# Patient Record
Sex: FEMALE | Race: WHITE | NOT HISPANIC OR LATINO | Employment: OTHER | URBAN - METROPOLITAN AREA
[De-identification: names, ages, dates, MRNs, and addresses within clinical notes are randomized per-mention and may not be internally consistent; named-entity substitution may affect disease eponyms.]

---

## 2017-04-04 ENCOUNTER — APPOINTMENT (OUTPATIENT)
Dept: WOMENS IMAGING | Facility: HOSPITAL | Age: 62
End: 2017-04-04

## 2017-04-04 PROCEDURE — 77067 SCR MAMMO BI INCL CAD: CPT | Performed by: RADIOLOGY

## 2017-10-17 ENCOUNTER — OFFICE VISIT (OUTPATIENT)
Dept: FAMILY MEDICINE CLINIC | Facility: CLINIC | Age: 62
End: 2017-10-17

## 2017-10-17 VITALS
BODY MASS INDEX: 39.4 KG/M2 | DIASTOLIC BLOOD PRESSURE: 90 MMHG | SYSTOLIC BLOOD PRESSURE: 136 MMHG | WEIGHT: 236.5 LBS | HEIGHT: 65 IN | TEMPERATURE: 97.8 F | HEART RATE: 72 BPM | RESPIRATION RATE: 16 BRPM | OXYGEN SATURATION: 98 %

## 2017-10-17 DIAGNOSIS — F43.21 GRIEF REACTION: ICD-10-CM

## 2017-10-17 DIAGNOSIS — Z23 NEED FOR INFLUENZA VACCINATION: ICD-10-CM

## 2017-10-17 DIAGNOSIS — E03.9 HYPOTHYROIDISM, UNSPECIFIED TYPE: Primary | ICD-10-CM

## 2017-10-17 DIAGNOSIS — H61.21 HEARING LOSS DUE TO CERUMEN IMPACTION, RIGHT: ICD-10-CM

## 2017-10-17 PROCEDURE — 99204 OFFICE O/P NEW MOD 45 MIN: CPT | Performed by: FAMILY MEDICINE

## 2017-10-17 PROCEDURE — 90686 IIV4 VACC NO PRSV 0.5 ML IM: CPT | Performed by: FAMILY MEDICINE

## 2017-10-17 PROCEDURE — 90471 IMMUNIZATION ADMIN: CPT | Performed by: FAMILY MEDICINE

## 2017-10-17 RX ORDER — KETOCONAZOLE 20 MG/ML
120 SHAMPOO TOPICAL 2 TIMES WEEKLY
Refills: 0 | COMMUNITY
Start: 2017-08-16 | End: 2019-07-02

## 2017-10-17 RX ORDER — GLIMEPIRIDE 2 MG/1
1 TABLET ORAL 2 TIMES DAILY
COMMUNITY
End: 2019-11-01 | Stop reason: SDUPTHER

## 2017-10-17 RX ORDER — LEFLUNOMIDE 20 MG/1
20 TABLET ORAL DAILY
COMMUNITY

## 2017-10-17 RX ORDER — RAMIPRIL 10 MG/1
10 CAPSULE ORAL DAILY
Refills: 2 | COMMUNITY
Start: 2017-09-03 | End: 2017-12-12 | Stop reason: SDUPTHER

## 2017-10-17 RX ORDER — FLUOCINOLONE ACETONIDE 0.11 MG/ML
0.01 OIL TOPICAL
Refills: 0 | COMMUNITY
Start: 2017-08-16 | End: 2019-07-02

## 2017-10-17 RX ORDER — LEVOTHYROXINE SODIUM 112 MCG
112 TABLET ORAL DAILY
Refills: 2 | COMMUNITY
Start: 2017-09-03 | End: 2017-10-30

## 2017-10-17 NOTE — PATIENT INSTRUCTIONS
Please fill out a record release form.      Try to find out if your sister was ever tested for Trujillo Syndrome and/or BRCA 1/2.    Consider calling [facility] to enquire about grief counseling.      Ask Dr. Watkins about whether you can get a pneumonia vaccine.      Try to check your thyroid for lumps each month.      Complicated Grieving  Grief is a normal response to the death of someone close to you. Feelings of fear, anger, and guilt can affect almost everyone who loses a loved one. It is also common to have symptoms of depression while you are grieving. These include problems with sleep, loss of appetite, and lack of energy. They may last for weeks or months after a loss.  Complicated grief is different from normal grief or depression. Normal grieving involves sadness and feelings of loss, but these feelings are not constant. Complicated grief is a constant and severe type of grief. It interferes with your ability to function normally. It may last for several months to a year or longer. Complicated grief may require treatment from a mental health care provider.  CAUSES   It is not known why some people continue to struggle with grief and others do not. You may be at higher risk for complicated grief if:  · The death of your loved one was sudden or unexpected.  · The death of your loved one was due to a violent event.  · Your loved one committed suicide.  · Your loved one was a child or a young person.  · You were very close to or dependent on the loved one.  · You have a history of depression.  SIGNS AND SYMPTOMS  Signs and symptoms of complicated grief may include:  · Feeling disbelief or numbness.  · Being unable to enjoy good memories of your loved one.  · Needing to avoid anything that reminds you of your loved one.  · Being unable to stop thinking about the death.  · Feeling intense anger or guilt.  · Feeling alone and hopeless.  · Feeling that your life is meaningless and empty.  · Losing the desire to  live.  DIAGNOSIS  Your health care provider may diagnose complicated grief if:  · You have constant symptoms of grief for 6-12 months or longer.  · Your symptoms are interfering with your ability to live your life.  Your health care provider may want you to see a mental health care provider. Many symptoms of depression are similar to the symptoms of complicated grief. It is important to be evaluated for complicated grief along with other mental health conditions.  TREATMENT   Talk therapy with a mental health provider is the most common treatment for complicated grief. During therapy, you will learn healthy ways to cope with the loss of your loved one. In some cases, your mental health care provider may also recommend antidepressant medicines.  HOME CARE INSTRUCTIONS  · Take care of yourself.  ¨ Eat regular meals and maintain a healthy diet. Eat plenty of fruits, vegetables, and whole grains.  ¨ Try to get some exercise each day.  ¨ Keep regular hours for sleep. Try to get at least 8 hours of sleep each night.  · Do not use drugs or alcohol to ease your symptoms.  · Take medicines only as directed by your health care provider.  · Spend time with friends and loved ones.  · Consider joining a grief (bereavement) support group to help you deal with your loss.  · Keep all follow-up visits as directed by your health care provider. This is important.  SEEK MEDICAL CARE IF:  · Your symptoms keep you from functioning normally.  · Your symptoms do not get better with treatment.  SEEK IMMEDIATE MEDICAL CARE IF:  · You have serious thoughts of hurting yourself or someone else.  · You have suicidal feelings.     This information is not intended to replace advice given to you by your health care provider. Make sure you discuss any questions you have with your health care provider.     Document Released: 12/18/2006 Document Revised: 04/10/2017 Document Reviewed: 05/28/2015  [employer] Interactive Patient Education ©2017 [employer]  Inc[employer]

## 2017-10-17 NOTE — PROGRESS NOTES
Subjective   Nehal Diaz is a 61 y.o. female. Being seen in office today to establish care.    Chief Complaint   Patient presents with   • Establish Care     change PCP    • Hypothyroidism     medication refill       HPI     Hypothyroidism:  -chronic problem  -dose of Synthroid increased to 112 mcg last April (6 months ago)  -occasional palpitations make her think her dose may be too high, so she would like to get her TSH checked today  -she also needs a Synthroid refill    Acute grief:  -pt recently lost her identical twin sister to uterine cancer  -pt's step daughter also recently  from ovarian cancer at age 24  - pt's favorite aunt also just  of lung cancer  -her step-daughter's  is raising their young child (who is like a grandchild to pt), so that is good  -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 Marko Watkins at [address] [facility], [address], [phone]  -always gets labs done each month  -next Rheumatology appt is scheduled for next week    HTN:  -BP usually well controlled on current regimen   -it has been difficult to exercise or eat well with recent deaths in the family, but she is planning to get back in the gym soon    Pap smear and mammogram normal in 2017 with her Gynecologist Dr. Nora Riojas  Colonoscopy WNL in 2017, plan for repeat in 5 yr         Review of Systems   Constitutional: Positive for fever and unexpected weight change (gaining weight). Negative for appetite change and fatigue.   HENT: Positive for hearing loss (on right). Negative for congestion, ear discharge, ear pain, rhinorrhea and sinus pressure.    Eyes: Negative for pain and visual disturbance.   Respiratory: Negative for cough, chest tightness and shortness of  breath.    Cardiovascular: Positive for palpitations. Negative for chest pain.   Gastrointestinal: Negative for abdominal pain, blood in stool, constipation, diarrhea, nausea and vomiting.   Genitourinary: Negative for dysuria, hematuria and vaginal bleeding.   Musculoskeletal: Negative for arthralgias, gait problem and myalgias.   Skin: Negative for rash and wound.   Allergic/Immunologic: Positive for environmental allergies.   Neurological: Negative for dizziness, syncope, weakness, light-headedness and numbness.   Hematological: Negative for adenopathy. Does not bruise/bleed easily.   Psychiatric/Behavioral: Positive for decreased concentration, dysphoric mood and sleep disturbance. Negative for suicidal ideas. The patient is not nervous/anxious.    All other systems reviewed and are negative.      The following portions of the patient's history were reviewed and updated as appropriate: allergies, current medications, past family history, past medical history, past social history, past surgical history and problem list.    Past Medical History:   Diagnosis Date   • Arthritis    • Cancer     skin   • Cataract    • Cholelithiasis    • GERD (gastroesophageal reflux disease)    • Hypertension    • Hypothyroidism    • Kidney stone    • Obesity    • Osteoporosis    • Rheumatoid arthritis    • Seborrheic dermatitis of scalp    • Visual impairment      Past Surgical History:   Procedure Laterality Date   • APPENDECTOMY     • CHOLECYSTECTOMY     • COLONOSCOPY     • EYE SURGERY     • HYSTERECTOMY     • SKIN CANCER EXCISION       Family History   Problem Relation Age of Onset   • Cancer Mother      breast and renal cell   • Kidney disease Mother    • Hyperlipidemia Mother    • Hypertension Mother    • Alcohol abuse Father    • Arthritis Father    • Cancer Father      lung   • Heart disease Father    • Hypertension Father    • Cancer Sister      ovarian   • Diabetes Sister    • Hypertension Sister    • Thyroid disease Sister     • Cancer Brother    • Arthritis Maternal Aunt    • Cancer Maternal Aunt      breast   • Mental illness Maternal Aunt    • Hyperlipidemia Maternal Aunt    • Hypertension Maternal Aunt    • Alcohol abuse Maternal Uncle    • Cancer Maternal Uncle      prostate   • Heart disease Maternal Uncle    • Arthritis Paternal Aunt    • Cancer Paternal Aunt      ovarian   • Diabetes Maternal Grandfather    • Miscarriages / Stillbirths Paternal Grandmother    • Cancer Paternal Grandfather      prostate   • Cancer Sister      uterine   • Cancer Sister      colon   • Cancer Maternal Aunt      colon     Social History     Social History   • Marital status: Unknown       Social History Main Topics   • Smoking status: Never Smoker   • Smokeless tobacco: Never Used   • Alcohol use Yes      Comment: socially, rarely   • Drug use: No   • Sexual activity: No     Social History Narrative    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  Lives at home with her .          Allergies   Allergen Reactions   • Codeine Nausea And Vomiting          Current Outpatient Prescriptions:   •  abatacept (ORENCIA) 250 MG injection, Infuse  into a venous catheter., Disp: , Rfl:   •  FLUOCINOLONE ACETONIDE SCALP 0.01 % oil, 0.01 application., Disp: , Rfl: 0  •  ketoconazole (NIZORAL) 2 % shampoo, Apply 120 application topically 2 (Two) Times a Week., Disp: , Rfl: 0  •  leflunomide (ARAVA) 20 MG tablet, Take 20 mg by mouth Daily., Disp: , Rfl:   •  ramipril (ALTACE) 10 MG capsule, Take 10 mg by mouth Daily., Disp: , Rfl: 2  •  SYNTHROID 112 MCG tablet, Take 112 mcg by mouth Daily., Disp: , Rfl: 2  •  timolol (TIMOPTIC) 0.25 % ophthalmic solution, 1 drop 2 (Two) Times a Day., Disp: , Rfl:     Objective     Vitals:    10/17/17 1520   BP: 136/90   Pulse: 72   Resp: 16   Temp: 97.8 °F (36.6 °C)   SpO2: 98%       Physical Exam   Constitutional: Vital signs are normal. She appears well-developed and well-nourished. No  distress.   BMI 40   HENT:   Head: Normocephalic and atraumatic.   Left Ear: Tympanic membrane and ear canal normal.   Nose: Nose normal.   Mouth/Throat: Oropharynx is clear and moist.   Cerumen obscuring right TM   Eyes: Conjunctivae are normal. Pupils are equal, round, and reactive to light.   Neck: Thyromegaly present.   Cardiovascular: Normal rate, regular rhythm, S1 normal, S2 normal and normal heart sounds.  Exam reveals no gallop and no friction rub.    No murmur heard.  Pulses:       Radial pulses are 2+ on the right side, and 2+ on the left side.   Pulmonary/Chest: Effort normal and breath sounds normal. She has no wheezes. She has no rales.   Abdominal: Soft. Bowel sounds are normal. She exhibits no distension. There is no hepatosplenomegaly. There is no tenderness. There is no rebound and no guarding.   Musculoskeletal: She exhibits no edema or deformity.   Lymphadenopathy:     She has no cervical adenopathy.        Right: No supraclavicular adenopathy present.        Left: No supraclavicular adenopathy present.   Neurological: She is alert. She has normal strength. Gait normal.   Skin: Skin is warm and dry. No cyanosis. Nails show no clubbing.   Psychiatric: She has a normal mood and affect. Her speech is normal and behavior is normal.   Nursing note and vitals reviewed.      ASSESSMENT/PLAN       Problem List Items Addressed This Visit        Endocrine    Hypothyroidism - Primary    Relevant Medications    SYNTHROID 112 MCG tablet    Other Relevant Orders    TSH (Completed)    Ambulatory Referral to ENT (Otolaryngology)      Other Visit Diagnoses     Grief reaction      Pt advised to seek grief counseling through [facility]      Hearing loss due to cerumen impaction, right        Relevant Orders    Ambulatory Referral to ENT (Otolaryngology)  Pt advised to use a 50/50 mixture of white vinegar and rubbing alcohol to loosen cerumen prior to ENT appointment      Need for influenza vaccination        Relevant  Orders    Flu Vaccine Intradermal Quad 18-64YR (FLUZONE INJ 6320-7833) (Completed)        Labs requested from pt's Rheumatologist's office      Patient Instructions   Please fill out a record release form.      Try to find out if your sister was ever tested for Trujillo Syndrome and/or BRCA 1/2.    Consider calling [facility] to enquire about grief counseling.      Ask Dr. Watkins about whether you can get a pneumonia vaccine.      Try to check your thyroid for lumps each month.      Complicated Grieving  Grief is a normal response to the death of someone close to you. Feelings of fear, anger, and guilt can affect almost everyone who loses a loved one. It is also common to have symptoms of depression while you are grieving. These include problems with sleep, loss of appetite, and lack of energy. They may last for weeks or months after a loss.  Complicated grief is different from normal grief or depression. Normal grieving involves sadness and feelings of loss, but these feelings are not constant. Complicated grief is a constant and severe type of grief. It interferes with your ability to function normally. It may last for several months to a year or longer. Complicated grief may require treatment from a mental health care provider.  CAUSES   It is not known why some people continue to struggle with grief and others do not. You may be at higher risk for complicated grief if:  · The death of your loved one was sudden or unexpected.  · The death of your loved one was due to a violent event.  · Your loved one committed suicide.  · Your loved one was a child or a young person.  · You were very close to or dependent on the loved one.  · You have a history of depression.  SIGNS AND SYMPTOMS  Signs and symptoms of complicated grief may include:  · Feeling disbelief or numbness.  · Being unable to enjoy good memories of your loved one.  · Needing to avoid anything that reminds you of your loved one.  · Being unable to stop thinking  about the death.  · Feeling intense anger or guilt.  · Feeling alone and hopeless.  · Feeling that your life is meaningless and empty.  · Losing the desire to live.  DIAGNOSIS  Your health care provider may diagnose complicated grief if:  · You have constant symptoms of grief for 6-12 months or longer.  · Your symptoms are interfering with your ability to live your life.  Your health care provider may want you to see a mental health care provider. Many symptoms of depression are similar to the symptoms of complicated grief. It is important to be evaluated for complicated grief along with other mental health conditions.  TREATMENT   Talk therapy with a mental health provider is the most common treatment for complicated grief. During therapy, you will learn healthy ways to cope with the loss of your loved one. In some cases, your mental health care provider may also recommend antidepressant medicines.  HOME CARE INSTRUCTIONS  · Take care of yourself.  ¨ Eat regular meals and maintain a healthy diet. Eat plenty of fruits, vegetables, and whole grains.  ¨ Try to get some exercise each day.  ¨ Keep regular hours for sleep. Try to get at least 8 hours of sleep each night.  · Do not use drugs or alcohol to ease your symptoms.  · Take medicines only as directed by your health care provider.  · Spend time with friends and loved ones.  · Consider joining a grief (bereavement) support group to help you deal with your loss.  · Keep all follow-up visits as directed by your health care provider. This is important.  SEEK MEDICAL CARE IF:  · Your symptoms keep you from functioning normally.  · Your symptoms do not get better with treatment.  SEEK IMMEDIATE MEDICAL CARE IF:  · You have serious thoughts of hurting yourself or someone else.  · You have suicidal feelings.     This information is not intended to replace advice given to you by your health care provider. Make sure you discuss any questions you have with your health care  provider.     Document Released: 12/18/2006 Document Revised: 04/10/2017 Document Reviewed: 05/28/2015  [employer] Interactive Patient Education ©2017 Elsevier Inc.      Return in about 2 months (around 12/17/2017).      Jannet Tripp MD  10/29/17

## 2017-10-18 LAB — TSH SERPL DL<=0.005 MIU/L-ACNC: 5.01 UIU/ML (ref 0.45–4.5)

## 2017-10-30 ENCOUNTER — TELEPHONE (OUTPATIENT)
Dept: FAMILY MEDICINE CLINIC | Facility: CLINIC | Age: 62
End: 2017-10-30

## 2017-10-30 DIAGNOSIS — E03.9 HYPOTHYROIDISM, UNSPECIFIED TYPE: Primary | ICD-10-CM

## 2017-10-30 RX ORDER — LEVOTHYROXINE SODIUM 0.12 MG/1
125 TABLET ORAL DAILY
Qty: 30 TABLET | Refills: 3 | Status: SHIPPED | OUTPATIENT
Start: 2017-10-30 | End: 2018-03-20 | Stop reason: SDUPTHER

## 2017-10-30 NOTE — TELEPHONE ENCOUNTER
----- Message from [place] sent at 10/30/2017  1:59 PM EDT -----  Regarding: Lab Results  Contact: [phone]  Pt called regarding her lab that she had done on 10/17/17. She said that she has not heard anything back regarding her tsh lab. She would like for someone to give her a call regarding this lab and some questions about her meds.

## 2017-10-31 ENCOUNTER — TELEPHONE (OUTPATIENT)
Dept: FAMILY MEDICINE CLINIC | Facility: CLINIC | Age: 62
End: 2017-10-31

## 2017-10-31 DIAGNOSIS — E03.9 HYPOTHYROIDISM, UNSPECIFIED TYPE: Primary | ICD-10-CM

## 2017-12-12 ENCOUNTER — OFFICE VISIT (OUTPATIENT)
Dept: FAMILY MEDICINE CLINIC | Facility: CLINIC | Age: 62
End: 2017-12-12

## 2017-12-12 VITALS
WEIGHT: 238 LBS | BODY MASS INDEX: 39.65 KG/M2 | HEIGHT: 65 IN | HEART RATE: 82 BPM | OXYGEN SATURATION: 98 % | TEMPERATURE: 98.2 F | SYSTOLIC BLOOD PRESSURE: 130 MMHG | DIASTOLIC BLOOD PRESSURE: 80 MMHG

## 2017-12-12 DIAGNOSIS — M54.6 ACUTE RIGHT-SIDED THORACIC BACK PAIN: Primary | ICD-10-CM

## 2017-12-12 DIAGNOSIS — I10 ESSENTIAL HYPERTENSION: ICD-10-CM

## 2017-12-12 DIAGNOSIS — E03.9 ACQUIRED HYPOTHYROIDISM: ICD-10-CM

## 2017-12-12 PROCEDURE — 99214 OFFICE O/P EST MOD 30 MIN: CPT | Performed by: FAMILY MEDICINE

## 2017-12-12 RX ORDER — NAPROXEN 500 MG/1
500 TABLET ORAL 2 TIMES DAILY WITH MEALS
COMMUNITY
End: 2022-03-15

## 2017-12-12 RX ORDER — RAMIPRIL 10 MG/1
10 CAPSULE ORAL DAILY
Qty: 30 CAPSULE | Refills: 11 | Status: SHIPPED | OUTPATIENT
Start: 2017-12-12 | End: 2018-12-03 | Stop reason: SDUPTHER

## 2017-12-12 NOTE — PATIENT INSTRUCTIONS
Try using a heating pad on your back.  It is okay to use over the counter Aleve as needed for back pain.

## 2017-12-12 NOTE — PROGRESS NOTES
Subjective   eNhal Diaz is a 62 y.o. female here to discuss back pain.  Patient stated she has taken OTC Naproxen to help with her symptoms.    Chief Complaint   Patient presents with   • Back Pain       HPI     Back pain:  -mid-thoracic   -started 4 days ago  -improved with OTC Aleve 1-2 x per day  -gradually improving but still present  -occasional chronic numbness and tingling in hands but no weakness  -she worries about shingles and/or pneumonia since she is immunocompromised w/her medications for RA    Pt continues to grieve the loss of her twin sister.  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  She has not yet started counseling due to time constraints.      Hypothyroidism:  -last TSH elevated at 5.01 in October (2 months ago)  -tolerating increased dose of Levothyroxine well, but missed 12 days in November while on vacation    HTN:  -BP running 120s/60s at home   -tolerating ramipril 10 mg daily, refill needed  no chest pain, dizziness, headaches, or acute vision changes         Review of Systems   Constitutional: Negative for fatigue and fever.   HENT: Negative for congestion and sore throat.    Respiratory: Negative for cough and shortness of breath.    Cardiovascular: Negative for chest pain and palpitations.   Gastrointestinal: Negative for abdominal pain and nausea.        Occasional heartburn, responds well to Tums   Musculoskeletal: Positive for arthralgias (due to RA, stable), back pain and myalgias (in back).   Neurological: Negative for dizziness and headaches.   All other systems reviewed and are negative.      The following portions of the patient's history were reviewed and updated as appropriate: allergies, current medications, past family history, past medical history, past social history, past surgical history and problem list.    Past Medical History:   Diagnosis Date   • Arthritis    • Cancer     skin   • Cataract    • Cholelithiasis    • GERD (gastroesophageal reflux  disease)    • Hypertension    • Hypothyroidism    • Kidney stone    • Obesity    • Osteoporosis    • Rheumatoid arthritis    • Seborrheic dermatitis of scalp    • Visual impairment      Past Surgical History:   Procedure Laterality Date   • APPENDECTOMY     • CATARACT EXTRACTION     • CHOLECYSTECTOMY     • COLONOSCOPY     • EYE SURGERY     • HYSTERECTOMY     • SKIN CANCER EXCISION       Family History   Problem Relation Age of Onset   • Cancer Mother      breast and renal cell   • Kidney disease Mother    • Hyperlipidemia Mother    • Hypertension Mother    • Alcohol abuse Father    • Arthritis Father    • Cancer Father      lung   • Heart disease Father    • Hypertension Father    • Cancer Sister      ovarian   • Diabetes Sister    • Hypertension Sister    • Thyroid disease Sister    • Cancer Brother    • Arthritis Maternal Aunt    • Cancer Maternal Aunt      breast   • Mental illness Maternal Aunt    • Hyperlipidemia Maternal Aunt    • Hypertension Maternal Aunt    • Alcohol abuse Maternal Uncle    • Cancer Maternal Uncle      prostate   • Heart disease Maternal Uncle    • Arthritis Paternal Aunt    • Cancer Paternal Aunt      ovarian   • Diabetes Maternal Grandfather    • Miscarriages / Stillbirths Paternal Grandmother    • Cancer Paternal Grandfather      prostate   • Cancer Sister      uterine   • Cancer Sister      colon   • Cancer Maternal Aunt      colon     Social History       Social History Main Topics   • Smoking status: Never Smoker   • Smokeless tobacco: Never Used   • Alcohol use Yes      Comment: socially, rarely   • Drug use: No   • Sexual activity: No     Social History Narrative    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  Lives at home with her .          Allergies   Allergen Reactions   • Codeine Nausea And Vomiting        Outpatient Medications Prior to Visit   Medication Sig Dispense Refill   • abatacept (ORENCIA) 250 MG injection Infuse  into a venous  catheter.     • FLUOCINOLONE ACETONIDE SCALP 0.01 % oil 0.01 application.  0   • ketoconazole (NIZORAL) 2 % shampoo Apply 120 application topically 2 (Two) Times a Week.  0   • leflunomide (ARAVA) 20 MG tablet Take 20 mg by mouth Daily.     • levothyroxine (SYNTHROID) 125 MCG tablet Take 1 tablet by mouth Daily. 30 tablet 3   • timolol (TIMOPTIC) 0.25 % ophthalmic solution 1 drop 2 (Two) Times a Day.     • ramipril (ALTACE) 10 MG capsule Take 10 mg by mouth Daily.  2     No facility-administered medications prior to visit.        Objective     Vitals:    12/12/17 0950   BP: 130/80   Pulse: 82   Temp: 98.2 °F (36.8 °C)   SpO2: 98%       Physical Exam   Constitutional: She appears well-developed and well-nourished. No distress.   HENT:   Head: Normocephalic and atraumatic.   Cardiovascular: Normal rate, regular rhythm and normal heart sounds.  Exam reveals no gallop and no friction rub.    No murmur heard.  Pulmonary/Chest: Effort normal and breath sounds normal. No respiratory distress. She has no wheezes. She has no rhonchi. She has no rales.   Abdominal: Soft. Bowel sounds are normal. She exhibits no distension. There is no tenderness.   Musculoskeletal:        Cervical back: Normal.        Thoracic back: She exhibits tenderness (mild TTP of R paraspinal muscle spasms ) and spasm.        Lumbar back: Normal.   Skin: Skin is warm and dry. No rash noted.       ASSESSMENT/PLAN       Problem List Items Addressed This Visit        Cardiovascular and Mediastinum    Hypertension    Relevant Medications    ramipril (ALTACE) 10 MG capsule  Plan for CMP if not checked within the last 6 months at next appt       Endocrine    Hypothyroidism  Continue levothyroxine 125 mcg daily  Importance of this medication reviewed  TSH in 3 months      Other Visit Diagnoses     Acute right-sided thoracic back pain    -  Primary  Pt reassured that pain appears to be musculoskeletal at this time  Continue OTC NSAIDs   Recommend heating pad  prn and stretching  Pt declines Rx for muscle relaxer        Lab records requestsed from Dr. Watkins's office      Patient Instructions   Try using a heating pad on your back.  It is okay to use over the counter Aleve as needed for back pain.      Return in about 3 months (around 3/12/2018).      Janent Tripp MD  12/12/17

## 2018-01-17 ENCOUNTER — RESULTS ENCOUNTER (OUTPATIENT)
Dept: FAMILY MEDICINE CLINIC | Facility: CLINIC | Age: 63
End: 2018-01-17

## 2018-01-17 DIAGNOSIS — E03.9 HYPOTHYROIDISM, UNSPECIFIED TYPE: ICD-10-CM

## 2018-03-16 LAB — TSH SERPL DL<=0.005 MIU/L-ACNC: 0.68 MIU/ML (ref 0.27–4.2)

## 2018-03-20 ENCOUNTER — OFFICE VISIT (OUTPATIENT)
Dept: FAMILY MEDICINE CLINIC | Facility: CLINIC | Age: 63
End: 2018-03-20

## 2018-03-20 VITALS
DIASTOLIC BLOOD PRESSURE: 86 MMHG | HEART RATE: 89 BPM | HEIGHT: 64 IN | OXYGEN SATURATION: 98 % | BODY MASS INDEX: 40.8 KG/M2 | WEIGHT: 239 LBS | SYSTOLIC BLOOD PRESSURE: 126 MMHG | RESPIRATION RATE: 15 BRPM

## 2018-03-20 DIAGNOSIS — E03.9 HYPOTHYROIDISM, UNSPECIFIED TYPE: Primary | ICD-10-CM

## 2018-03-20 DIAGNOSIS — Z13.220 SCREENING FOR HYPERLIPIDEMIA: ICD-10-CM

## 2018-03-20 DIAGNOSIS — Z13.1 SCREENING FOR DIABETES MELLITUS: ICD-10-CM

## 2018-03-20 DIAGNOSIS — I10 ESSENTIAL HYPERTENSION: ICD-10-CM

## 2018-03-20 PROCEDURE — 99213 OFFICE O/P EST LOW 20 MIN: CPT | Performed by: FAMILY MEDICINE

## 2018-03-20 RX ORDER — LEVOTHYROXINE SODIUM 0.12 MG/1
125 TABLET ORAL DAILY
Qty: 30 TABLET | Refills: 5 | Status: SHIPPED | OUTPATIENT
Start: 2018-03-20 | End: 2018-08-24 | Stop reason: SDUPTHER

## 2018-04-05 ENCOUNTER — APPOINTMENT (OUTPATIENT)
Dept: WOMENS IMAGING | Facility: HOSPITAL | Age: 63
End: 2018-04-05

## 2018-04-05 PROCEDURE — 77067 SCR MAMMO BI INCL CAD: CPT | Performed by: RADIOLOGY

## 2018-04-05 PROCEDURE — 77063 BREAST TOMOSYNTHESIS BI: CPT | Performed by: RADIOLOGY

## 2018-04-23 RX ORDER — TIMOLOL MALEATE 5 MG/ML
SOLUTION/ DROPS OPHTHALMIC
Refills: 11 | COMMUNITY
Start: 2018-04-17

## 2018-05-07 ENCOUNTER — CLINICAL SUPPORT (OUTPATIENT)
Dept: FAMILY MEDICINE CLINIC | Facility: CLINIC | Age: 63
End: 2018-05-07

## 2018-05-07 DIAGNOSIS — Z23 NEED FOR VACCINATION: Primary | ICD-10-CM

## 2018-05-07 PROCEDURE — 90471 IMMUNIZATION ADMIN: CPT | Performed by: FAMILY MEDICINE

## 2018-05-07 PROCEDURE — 90632 HEPA VACCINE ADULT IM: CPT | Performed by: FAMILY MEDICINE

## 2018-05-31 ENCOUNTER — RESULTS ENCOUNTER (OUTPATIENT)
Dept: FAMILY MEDICINE CLINIC | Facility: CLINIC | Age: 63
End: 2018-05-31

## 2018-05-31 DIAGNOSIS — Z13.1 SCREENING FOR DIABETES MELLITUS: ICD-10-CM

## 2018-05-31 DIAGNOSIS — Z13.220 SCREENING FOR HYPERLIPIDEMIA: ICD-10-CM

## 2018-05-31 DIAGNOSIS — I10 ESSENTIAL HYPERTENSION: ICD-10-CM

## 2018-07-23 DIAGNOSIS — E03.9 HYPOTHYROIDISM, UNSPECIFIED TYPE: ICD-10-CM

## 2018-07-23 RX ORDER — LEVOTHYROXINE SODIUM 0.12 MG/1
125 TABLET ORAL DAILY
Qty: 30 TABLET | Refills: 0 | Status: SHIPPED | OUTPATIENT
Start: 2018-07-23 | End: 2018-08-16 | Stop reason: SDUPTHER

## 2018-08-16 ENCOUNTER — OFFICE VISIT (OUTPATIENT)
Dept: FAMILY MEDICINE CLINIC | Facility: CLINIC | Age: 63
End: 2018-08-16

## 2018-08-16 VITALS
RESPIRATION RATE: 13 BRPM | HEIGHT: 64 IN | SYSTOLIC BLOOD PRESSURE: 114 MMHG | WEIGHT: 245 LBS | BODY MASS INDEX: 41.83 KG/M2 | OXYGEN SATURATION: 99 % | DIASTOLIC BLOOD PRESSURE: 84 MMHG | HEART RATE: 78 BPM

## 2018-08-16 DIAGNOSIS — H61.22 IMPACTED CERUMEN OF LEFT EAR: ICD-10-CM

## 2018-08-16 DIAGNOSIS — M06.9 RHEUMATOID ARTHRITIS, INVOLVING UNSPECIFIED SITE, UNSPECIFIED RHEUMATOID FACTOR PRESENCE: ICD-10-CM

## 2018-08-16 DIAGNOSIS — Z00.00 ANNUAL PHYSICAL EXAM: Primary | ICD-10-CM

## 2018-08-16 DIAGNOSIS — E03.9 HYPOTHYROIDISM, UNSPECIFIED TYPE: ICD-10-CM

## 2018-08-16 DIAGNOSIS — Z13.1 SCREENING FOR DIABETES MELLITUS: ICD-10-CM

## 2018-08-16 DIAGNOSIS — E55.9 VITAMIN D DEFICIENCY: ICD-10-CM

## 2018-08-16 DIAGNOSIS — E78.5 HYPERLIPIDEMIA, UNSPECIFIED HYPERLIPIDEMIA TYPE: ICD-10-CM

## 2018-08-16 DIAGNOSIS — F32.A DEPRESSION, UNSPECIFIED DEPRESSION TYPE: ICD-10-CM

## 2018-08-16 LAB
25(OH)D3+25(OH)D2 SERPL-MCNC: 18.4 NG/ML (ref 30–100)
ALBUMIN SERPL-MCNC: 4.2 G/DL (ref 3.5–5.2)
ALBUMIN/GLOB SERPL: 1.2 G/DL
ALP SERPL-CCNC: 92 U/L (ref 39–117)
ALT SERPL-CCNC: 19 U/L (ref 1–33)
AST SERPL-CCNC: 17 U/L (ref 1–32)
BILIRUB SERPL-MCNC: 0.5 MG/DL (ref 0.1–1.2)
BUN SERPL-MCNC: 15 MG/DL (ref 8–23)
BUN/CREAT SERPL: 19.5 (ref 7–25)
CALCIUM SERPL-MCNC: 10.1 MG/DL (ref 8.6–10.5)
CHLORIDE SERPL-SCNC: 100 MMOL/L (ref 98–107)
CHOLEST SERPL-MCNC: 212 MG/DL (ref 0–200)
CO2 SERPL-SCNC: 28.1 MMOL/L (ref 22–29)
CREAT SERPL-MCNC: 0.77 MG/DL (ref 0.57–1)
ERYTHROCYTE [DISTWIDTH] IN BLOOD BY AUTOMATED COUNT: 14.6 % (ref 11.7–13)
GLOBULIN SER CALC-MCNC: 3.5 GM/DL
GLUCOSE SERPL-MCNC: 146 MG/DL (ref 65–99)
HBA1C MFR BLD: 6.73 % (ref 4.8–5.6)
HCT VFR BLD AUTO: 45.6 % (ref 35.6–45.5)
HDLC SERPL-MCNC: 40 MG/DL (ref 40–60)
HGB BLD-MCNC: 14.7 G/DL (ref 11.9–15.5)
LDLC SERPL CALC-MCNC: 119 MG/DL (ref 0–100)
MCH RBC QN AUTO: 29.7 PG (ref 26.9–32)
MCHC RBC AUTO-ENTMCNC: 32.2 G/DL (ref 32.4–36.3)
MCV RBC AUTO: 92.1 FL (ref 80.5–98.2)
PLATELET # BLD AUTO: 198 10*3/MM3 (ref 140–500)
POTASSIUM SERPL-SCNC: 4.5 MMOL/L (ref 3.5–5.2)
PROT SERPL-MCNC: 7.7 G/DL (ref 6–8.5)
RBC # BLD AUTO: 4.95 10*6/MM3 (ref 3.9–5.2)
SODIUM SERPL-SCNC: 140 MMOL/L (ref 136–145)
TRIGL SERPL-MCNC: 265 MG/DL (ref 0–150)
TSH SERPL DL<=0.005 MIU/L-ACNC: 4.74 MIU/ML (ref 0.27–4.2)
VLDLC SERPL CALC-MCNC: 53 MG/DL (ref 5–40)
WBC # BLD AUTO: 8.91 10*3/MM3 (ref 4.5–10.7)

## 2018-08-16 PROCEDURE — 69210 REMOVE IMPACTED EAR WAX UNI: CPT | Performed by: FAMILY MEDICINE

## 2018-08-16 PROCEDURE — 99396 PREV VISIT EST AGE 40-64: CPT | Performed by: FAMILY MEDICINE

## 2018-08-16 NOTE — PATIENT INSTRUCTIONS
"Consider getting involved with some volunteer work.    Consider enrolling in [employer] when you turn 63.      Try to exercise at least 3 times per week for 30 minutes per session.    Don't forget to get a flu shot in the fall and a second dose of Hep A vaccine in November!      DASH Eating Plan  DASH stands for \"Dietary Approaches to Stop Hypertension.\" The DASH eating plan is a healthy eating plan that has been shown to reduce high blood pressure (hypertension). It may also reduce your risk for type 2 diabetes, heart disease, and stroke. The DASH eating plan may also help with weight loss.  What are tips for following this plan?  General guidelines  · Avoid eating more than 2,300 mg (milligrams) of salt (sodium) a day. If you have hypertension, you may need to reduce your sodium intake to 1,500 mg a day.  · Limit alcohol intake to no more than 1 drink a day for nonpregnant women and 2 drinks a day for men. One drink equals 12 oz of beer, 5 oz of wine, or 1½ oz of hard liquor.  · Work with your health care provider to maintain a healthy body weight or to lose weight. Ask what an ideal weight is for you.  · Get at least 30 minutes of exercise that causes your heart to beat faster (aerobic exercise) most days of the week. Activities may include walking, swimming, or biking.  · Work with your health care provider or diet and nutrition specialist (dietitian) to adjust your eating plan to your individual calorie needs.  Reading food labels  · Check food labels for the amount of sodium per serving. Choose foods with less than 5 percent of the Daily Value of sodium. Generally, foods with less than 300 mg of sodium per serving fit into this eating plan.  · To find whole grains, look for the word \"whole\" as the first word in the ingredient list.  Shopping  · Buy products labeled as \"low-sodium\" or \"no salt added.\"  · Buy fresh foods. Avoid canned foods and premade or frozen meals.  Cooking  · Avoid adding salt when " cooking. Use salt-free seasonings or herbs instead of table salt or sea salt. Check with your health care provider or pharmacist before using salt substitutes.  · Do not rahman foods. Cook foods using healthy methods such as baking, boiling, grilling, and broiling instead.  · Cook with heart-healthy oils, such as olive, canola, soybean, or sunflower oil.  Meal planning    · Eat a balanced diet that includes:  ? 5 or more servings of fruits and vegetables each day. At each meal, try to fill half of your plate with fruits and vegetables.  ? Up to 6-8 servings of whole grains each day.  ? Less than 6 oz of lean meat, poultry, or fish each day. A 3-oz serving of meat is about the same size as a deck of cards. One egg equals 1 oz.  ? 2 servings of low-fat dairy each day.  ? A serving of nuts, seeds, or beans 5 times each week.  ? Heart-healthy fats. Healthy fats called Omega-3 fatty acids are found in foods such as flaxseeds and coldwater fish, like sardines, salmon, and mackerel.  · Limit how much you eat of the following:  ? Canned or prepackaged foods.  ? Food that is high in trans fat, such as fried foods.  ? Food that is high in saturated fat, such as fatty meat.  ? Sweets, desserts, sugary drinks, and other foods with added sugar.  ? Full-fat dairy products.  · Do not salt foods before eating.  · Try to eat at least 2 vegetarian meals each week.  · Eat more home-cooked food and less restaurant, buffet, and fast food.  · When eating at a restaurant, ask that your food be prepared with less salt or no salt, if possible.  What foods are recommended?  The items listed may not be a complete list. Talk with your dietitian about what dietary choices are best for you.  Grains  Whole-grain or whole-wheat bread. Whole-grain or whole-wheat pasta. Brown rice. Oatmeal. Quinoa. Bulgur. Whole-grain and low-sodium cereals. Adele bread. Low-fat, low-sodium crackers. Whole-wheat flour tortillas.  Vegetables  Fresh or frozen vegetables  (raw, steamed, roasted, or grilled). Low-sodium or reduced-sodium tomato and vegetable juice. Low-sodium or reduced-sodium tomato sauce and tomato paste. Low-sodium or reduced-sodium canned vegetables.  Fruits  All fresh, dried, or frozen fruit. Canned fruit in natural juice (without added sugar).  Meat and other protein foods  Skinless chicken or turkey. Ground chicken or turkey. Pork with fat trimmed off. Fish and seafood. Egg whites. Dried beans, peas, or lentils. Unsalted nuts, nut butters, and seeds. Unsalted canned beans. Lean cuts of beef with fat trimmed off. Low-sodium, lean deli meat.  Dairy  Low-fat (1%) or fat-free (skim) milk. Fat-free, low-fat, or reduced-fat cheeses. Nonfat, low-sodium ricotta or cottage cheese. Low-fat or nonfat yogurt. Low-fat, low-sodium cheese.  Fats and oils  Soft margarine without trans fats. Vegetable oil. Low-fat, reduced-fat, or light mayonnaise and salad dressings (reduced-sodium). Canola, safflower, olive, soybean, and sunflower oils. Avocado.  Seasoning and other foods  Herbs. Spices. Seasoning mixes without salt. Unsalted popcorn and pretzels. Fat-free sweets.  What foods are not recommended?  The items listed may not be a complete list. Talk with your dietitian about what dietary choices are best for you.  Grains  Baked goods made with fat, such as croissants, muffins, or some breads. Dry pasta or rice meal packs.  Vegetables  Creamed or fried vegetables. Vegetables in a cheese sauce. Regular canned vegetables (not low-sodium or reduced-sodium). Regular canned tomato sauce and paste (not low-sodium or reduced-sodium). Regular tomato and vegetable juice (not low-sodium or reduced-sodium). Pickles. Olives.  Fruits  Canned fruit in a light or heavy syrup. Fried fruit. Fruit in cream or butter sauce.  Meat and other protein foods  Fatty cuts of meat. Ribs. Fried meat. Scales. Sausage. Bologna and other processed lunch meats. Salami. Fatback. Hotdogs. Bratwurst. Salted nuts  and seeds. Canned beans with added salt. Canned or smoked fish. Whole eggs or egg yolks. Chicken or turkey with skin.  Dairy  Whole or 2% milk, cream, and half-and-half. Whole or full-fat cream cheese. Whole-fat or sweetened yogurt. Full-fat cheese. Nondairy creamers. Whipped toppings. Processed cheese and cheese spreads.  Fats and oils  Butter. Stick margarine. Lard. Shortening. Ghee. Scales fat. Tropical oils, such as coconut, palm kernel, or palm oil.  Seasoning and other foods  Salted popcorn and pretzels. Onion salt, garlic salt, seasoned salt, table salt, and sea salt. Worcestershire sauce. Tartar sauce. Barbecue sauce. Teriyaki sauce. Soy sauce, including reduced-sodium. Steak sauce. Canned and packaged gravies. Fish sauce. Oyster sauce. Cocktail sauce. Horseradish that you find on the shelf. Ketchup. Mustard. Meat flavorings and tenderizers. Bouillon cubes. Hot sauce and Tabasco sauce. Premade or packaged marinades. Premade or packaged taco seasonings. Relishes. Regular salad dressings.  Where to find more information:  · National [employer], [employer] [place]: www.nhlbi.nih.gov  · American Heart Association: www.heart.org  Summary  · The DASH eating plan is a healthy eating plan that has been shown to reduce high blood pressure (hypertension). It may also reduce your risk for type 2 diabetes, heart disease, and stroke.  · With the DASH eating plan, you should limit salt (sodium) intake to 2,300 mg a day. If you have hypertension, you may need to reduce your sodium intake to 1,500 mg a day.  · When on the DASH eating plan, aim to eat more fresh fruits and vegetables, whole grains, lean proteins, low-fat dairy, and heart-healthy fats.  · Work with your health care provider or diet and nutrition specialist (dietitian) to adjust your eating plan to your individual calorie needs.  This information is not intended to replace advice given to you by your health care provider. Make sure you discuss any questions  you have with your health care provider.  Document Released: 12/06/2012 Document Revised: 12/11/2017 Document Reviewed: 12/11/2017  [employer] Interactive Patient Education © 2018 [employer]

## 2018-08-16 NOTE — PROGRESS NOTES
Subjective   Nehal Diaz is a 62 y.o. female. Patient is here today for   Chief Complaint   Patient presents with   • Annual Exam     Physical            Nehal Diaz presents for an Annual Wellness Visit.  she has a history of   Patient Active Problem List   Diagnosis   • History of skin cancer   • Rheumatoid arthritis (CMS/HCC)   • Seborrheic dermatitis of scalp   • GERD (gastroesophageal reflux disease)   • Arthritis   • Hypertension   • Kidney stone   • Hypothyroidism   • Obesity   • Osteoporosis   • Visual impairment   • Chronic seasonal allergic rhinitis due to pollen   • Hyperlipidemia   .      HPI      Health Habits:  Dental Exam. up to date  Eye Exam. up to date  Exercise: 0 times/week.  Current exercise activities include: none   BP well controlled on current regimen  Pap smear, DEXA, and mammogram UTD in March with her OB/GYN, Dr. Riojas  Annual dermatology appt scheduled next week  She has consumed some orange juice this morning but no food  Vaccinations are UTD through Nov 2018  Colonoscopy UTD through Jan 2020    Pt is concerned about depression: hypersomnia, anhedonia, NO SI  -under a lot of stress with home rennovations  -retired about 1 yr ago  -grieving the loss of her twin sister last year    RA pain flaring up with the rain  Diclofenac gel prescribed by her Rheumatologist helps     The following portions of the patient's history were reviewed and updated as appropriate: allergies, current medications, past family history, past medical history, past social history, past surgical history and problem list.    Past Medical History:   Diagnosis Date   • Allergic     seasonal   • Arthritis    • Cancer (CMS/HCC)     skin, followed by Dermatology   • Cataract    • Cerumen impaction    • Cholelithiasis    • GERD (gastroesophageal reflux disease)    • Hyperlipidemia    • Hypertension    • Hypothyroidism    • Kidney stone    • Obesity    • Osteoporosis    • Rheumatoid arthritis (CMS/HCC)     followed by  Rheumatology, Dr. Watkins   • Seborrheic dermatitis of scalp    • Visual impairment      Past Surgical History:   Procedure Laterality Date   • APPENDECTOMY     • CATARACT EXTRACTION     • CHOLECYSTECTOMY     • COLONOSCOPY     • EYE SURGERY     • HYSTERECTOMY     • SKIN CANCER EXCISION       Family History   Problem Relation Age of Onset   • Cancer Mother         breast and renal cell   • Kidney disease Mother    • Hyperlipidemia Mother    • Hypertension Mother    • Alcohol abuse Father    • Arthritis Father    • Cancer Father         lung   • Heart disease Father    • Hypertension Father    • Cancer Sister         ovarian   • Diabetes Sister    • Hypertension Sister    • Thyroid disease Sister    • Cancer Brother    • Arthritis Maternal Aunt    • Cancer Maternal Aunt         breast   • Mental illness Maternal Aunt    • Hyperlipidemia Maternal Aunt    • Hypertension Maternal Aunt    • Alcohol abuse Maternal Uncle    • Cancer Maternal Uncle         prostate   • Heart disease Maternal Uncle    • Arthritis Paternal Aunt    • Cancer Paternal Aunt         ovarian   • Diabetes Maternal Grandfather    • Miscarriages / Stillbirths Paternal Grandmother    • Cancer Paternal Grandfather         prostate   • Cancer Sister         uterine   • Cancer Sister         colon   • Cancer Maternal Aunt         colon        Allergies   Allergen Reactions   • Codeine Nausea And Vomiting      Social History       Social History Main Topics   • Smoking status: Never Smoker   • Smokeless tobacco: Never Used   • Alcohol use Yes      Comment: socially, rarely   • Drug use: No   • Sexual activity: No     Social History Narrative    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  Lives at home with her .        Outpatient Medications Prior to Visit   Medication Sig Dispense Refill   • abatacept (ORENCIA) 250 MG injection Infuse  into a venous catheter.     • diclofenac (VOLTAREN) 1 % gel gel VICKEY 4 GRAMS TO  AFFECTED JOINTS QID PRN  3   • FLUOCINOLONE ACETONIDE SCALP 0.01 % oil 0.01 application.  0   • ketoconazole (NIZORAL) 2 % shampoo Apply 120 application topically 2 (Two) Times a Week.  0   • leflunomide (ARAVA) 20 MG tablet Take 20 mg by mouth Daily.     • levothyroxine (SYNTHROID) 125 MCG tablet Take 1 tablet by mouth Daily. 30 tablet 5   • naproxen (NAPROSYN) 500 MG tablet Take 500 mg by mouth 2 (Two) Times a Day With Meals.     • ramipril (ALTACE) 10 MG capsule Take 1 capsule by mouth Daily. 30 capsule 11   • timolol (TIMOPTIC) 0.25 % ophthalmic solution 1 drop 2 (Two) Times a Day.           Review of Systems   Constitutional: Positive for unexpected weight change (10 lb gain in the last year). Negative for appetite change, fatigue and fever.   HENT: Positive for ear pain (L sided, x1 week) and hearing loss (L sided x 1 week.). Negative for congestion, rhinorrhea and sinus pressure.    Eyes: Negative for pain and visual disturbance.   Respiratory: Negative for cough, chest tightness and shortness of breath.    Cardiovascular: Negative for chest pain and palpitations.   Gastrointestinal: Negative for abdominal pain, blood in stool, constipation, diarrhea, nausea and vomiting.   Genitourinary: Negative for dysuria, hematuria and vaginal bleeding.   Musculoskeletal: Positive for arthralgias (chronic pain and stiffness due to RA). Negative for myalgias.   Skin: Negative for rash and wound.   Neurological: Negative for dizziness, syncope, weakness, light-headedness and numbness.   Hematological: Negative for adenopathy. Does not bruise/bleed easily.   Psychiatric/Behavioral: Positive for dysphoric mood. Negative for sleep disturbance and suicidal ideas. The patient is not nervous/anxious.          Objective       Vitals:    08/16/18 0937   BP: 114/84   Pulse: 78   Resp: 13   SpO2: 99%   BMI 41    Physical Exam   Constitutional: She appears well-developed and well-nourished. No distress.   HENT:   Head: Normocephalic  and atraumatic.   Right Ear: Ear canal normal. Tympanic membrane is scarred.   Left Ear: Tympanic membrane is scarred.   Nose: Nose normal.   Mouth/Throat: Oropharynx is clear and moist.   Dried cerumen successfully removed from L external ear canal with a curette.  Pt tolerated procedure well.     Eyes: Pupils are equal, round, and reactive to light. Conjunctivae are normal.   Neck: No thyromegaly present.   Cardiovascular: Normal rate, regular rhythm, S1 normal, S2 normal and normal heart sounds.  Exam reveals no gallop and no friction rub.    No murmur heard.  Pulses:       Radial pulses are 2+ on the right side, and 2+ on the left side.   Pulmonary/Chest: Effort normal and breath sounds normal. She has no wheezes. She has no rales.   Abdominal: Soft. Bowel sounds are normal. She exhibits no distension. There is no hepatosplenomegaly. There is no tenderness. There is no rebound and no guarding.   Musculoskeletal: She exhibits no edema or deformity.   Lymphadenopathy:     She has no cervical adenopathy.        Right: No supraclavicular adenopathy present.        Left: No supraclavicular adenopathy present.   Neurological: She is alert. She has normal strength. Gait (stiff) abnormal.   Skin: Skin is warm and dry. No cyanosis. Nails show no clubbing.   Psychiatric: Her speech is normal and behavior is normal. She exhibits a depressed mood.   Nursing note and vitals reviewed.      ASSESSMENT/PLAN       Problem List Items Addressed This Visit        Cardiovascular and Mediastinum    Hyperlipidemia    Relevant Orders    Check Lipid panel       Endocrine    Hypothyroidism    Relevant Orders    TSH  Continue levothyroxine 125 mcg daily       Musculoskeletal and Integument    Rheumatoid arthritis (CMS/HCC)    Relevant Orders    Comprehensive Metabolic Panel    CBC (No Diff)    Vitamin D 25 Hydroxy  Continue to follow up w/Dr. Watkins as scheduled      Other Visit Diagnoses     Annual physical exam    -  Primary    Relevant  "Orders    Ambulatory Referral to Behavioral Health    Comprehensive Metabolic Panel    Hemoglobin A1c    TSH    CBC (No Diff)    Lipid panel    Vitamin D 25 Hydroxy    Depression, unspecified depression type        Relevant Orders    Ambulatory Referral to Behavioral Health  Pt declines Rx medication at this time      Screening for diabetes mellitus        Relevant Orders    Comprehensive Metabolic Panel    Hemoglobin A1c    Vitamin D deficiency        Relevant Orders    Vitamin D 25 Hydroxy    Impacted cerumen of left ear      Successfully removed w/curette            Patient Instructions   Consider getting involved with some volunteer work.    Consider enrolling in [employer] when you turn 63.      Try to exercise at least 3 times per week for 30 minutes per session.    Don't forget to get a flu shot in the fall and a second dose of Hep A vaccine in November!      DASH Eating Plan  DASH stands for \"Dietary Approaches to Stop Hypertension.\" The DASH eating plan is a healthy eating plan that has been shown to reduce high blood pressure (hypertension). It may also reduce your risk for type 2 diabetes, heart disease, and stroke. The DASH eating plan may also help with weight loss.  What are tips for following this plan?  General guidelines  · Avoid eating more than 2,300 mg (milligrams) of salt (sodium) a day. If you have hypertension, you may need to reduce your sodium intake to 1,500 mg a day.  · Limit alcohol intake to no more than 1 drink a day for nonpregnant women and 2 drinks a day for men. One drink equals 12 oz of beer, 5 oz of wine, or 1½ oz of hard liquor.  · Work with your health care provider to maintain a healthy body weight or to lose weight. Ask what an ideal weight is for you.  · Get at least 30 minutes of exercise that causes your heart to beat faster (aerobic exercise) most days of the week. Activities may include walking, swimming, or biking.  · Work with your health care provider or diet and " "nutrition specialist (dietitian) to adjust your eating plan to your individual calorie needs.  Reading food labels  · Check food labels for the amount of sodium per serving. Choose foods with less than 5 percent of the Daily Value of sodium. Generally, foods with less than 300 mg of sodium per serving fit into this eating plan.  · To find whole grains, look for the word \"whole\" as the first word in the ingredient list.  Shopping  · Buy products labeled as \"low-sodium\" or \"no salt added.\"  · Buy fresh foods. Avoid canned foods and premade or frozen meals.  Cooking  · Avoid adding salt when cooking. Use salt-free seasonings or herbs instead of table salt or sea salt. Check with your health care provider or pharmacist before using salt substitutes.  · Do not rahman foods. Cook foods using healthy methods such as baking, boiling, grilling, and broiling instead.  · Cook with heart-healthy oils, such as olive, canola, soybean, or sunflower oil.  Meal planning    · Eat a balanced diet that includes:  ? 5 or more servings of fruits and vegetables each day. At each meal, try to fill half of your plate with fruits and vegetables.  ? Up to 6-8 servings of whole grains each day.  ? Less than 6 oz of lean meat, poultry, or fish each day. A 3-oz serving of meat is about the same size as a deck of cards. One egg equals 1 oz.  ? 2 servings of low-fat dairy each day.  ? A serving of nuts, seeds, or beans 5 times each week.  ? Heart-healthy fats. Healthy fats called Omega-3 fatty acids are found in foods such as flaxseeds and coldwater fish, like sardines, salmon, and mackerel.  · Limit how much you eat of the following:  ? Canned or prepackaged foods.  ? Food that is high in trans fat, such as fried foods.  ? Food that is high in saturated fat, such as fatty meat.  ? Sweets, desserts, sugary drinks, and other foods with added sugar.  ? Full-fat dairy products.  · Do not salt foods before eating.  · Try to eat at least 2 vegetarian " meals each week.  · Eat more home-cooked food and less restaurant, buffet, and fast food.  · When eating at a restaurant, ask that your food be prepared with less salt or no salt, if possible.  What foods are recommended?  The items listed may not be a complete list. Talk with your dietitian about what dietary choices are best for you.  Grains  Whole-grain or whole-wheat bread. Whole-grain or whole-wheat pasta. Brown rice. Oatmeal. Quinoa. Bulgur. Whole-grain and low-sodium cereals. Adele bread. Low-fat, low-sodium crackers. Whole-wheat flour tortillas.  Vegetables  Fresh or frozen vegetables (raw, steamed, roasted, or grilled). Low-sodium or reduced-sodium tomato and vegetable juice. Low-sodium or reduced-sodium tomato sauce and tomato paste. Low-sodium or reduced-sodium canned vegetables.  Fruits  All fresh, dried, or frozen fruit. Canned fruit in natural juice (without added sugar).  Meat and other protein foods  Skinless chicken or turkey. Ground chicken or turkey. Pork with fat trimmed off. Fish and seafood. Egg whites. Dried beans, peas, or lentils. Unsalted nuts, nut butters, and seeds. Unsalted canned beans. Lean cuts of beef with fat trimmed off. Low-sodium, lean deli meat.  Dairy  Low-fat (1%) or fat-free (skim) milk. Fat-free, low-fat, or reduced-fat cheeses. Nonfat, low-sodium ricotta or cottage cheese. Low-fat or nonfat yogurt. Low-fat, low-sodium cheese.  Fats and oils  Soft margarine without trans fats. Vegetable oil. Low-fat, reduced-fat, or light mayonnaise and salad dressings (reduced-sodium). Canola, safflower, olive, soybean, and sunflower oils. Avocado.  Seasoning and other foods  Herbs. Spices. Seasoning mixes without salt. Unsalted popcorn and pretzels. Fat-free sweets.  What foods are not recommended?  The items listed may not be a complete list. Talk with your dietitian about what dietary choices are best for you.  Grains  Baked goods made with fat, such as croissants, muffins, or some  breads. Dry pasta or rice meal packs.  Vegetables  Creamed or fried vegetables. Vegetables in a cheese sauce. Regular canned vegetables (not low-sodium or reduced-sodium). Regular canned tomato sauce and paste (not low-sodium or reduced-sodium). Regular tomato and vegetable juice (not low-sodium or reduced-sodium). Pickles. Olives.  Fruits  Canned fruit in a light or heavy syrup. Fried fruit. Fruit in cream or butter sauce.  Meat and other protein foods  Fatty cuts of meat. Ribs. Fried meat. Scales. Sausage. Bologna and other processed lunch meats. Salami. Fatback. Hotdogs. Bratwurst. Salted nuts and seeds. Canned beans with added salt. Canned or smoked fish. Whole eggs or egg yolks. Chicken or turkey with skin.  Dairy  Whole or 2% milk, cream, and half-and-half. Whole or full-fat cream cheese. Whole-fat or sweetened yogurt. Full-fat cheese. Nondairy creamers. Whipped toppings. Processed cheese and cheese spreads.  Fats and oils  Butter. Stick margarine. Lard. Shortening. Ghee. Scales fat. Tropical oils, such as coconut, palm kernel, or palm oil.  Seasoning and other foods  Salted popcorn and pretzels. Onion salt, garlic salt, seasoned salt, table salt, and sea salt. [facility] sauce. Tartar sauce. Barbecue sauce. Teriyaki sauce. Soy sauce, including reduced-sodium. Steak sauce. Canned and packaged gravies. Fish sauce. Oyster sauce. Cocktail sauce. Horseradish that you find on the shelf. Ketchup. Mustard. Meat flavorings and tenderizers. Bouillon cubes. Hot sauce and Tabasco sauce. Premade or packaged marinades. Premade or packaged taco seasonings. Relishes. Regular salad dressings.  Where to find more information:  · National Heart, Lung, and Blood [place]: www.nhlbi.nih.gov  · American Heart Association: www.heart.org  Summary  · The DASH eating plan is a healthy eating plan that has been shown to reduce high blood pressure (hypertension). It may also reduce your risk for type 2 diabetes, heart disease, and  stroke.  · With the DASH eating plan, you should limit salt (sodium) intake to 2,300 mg a day. If you have hypertension, you may need to reduce your sodium intake to 1,500 mg a day.  · When on the DASH eating plan, aim to eat more fresh fruits and vegetables, whole grains, lean proteins, low-fat dairy, and heart-healthy fats.  · Work with your health care provider or diet and nutrition specialist (dietitian) to adjust your eating plan to your individual calorie needs.  This information is not intended to replace advice given to you by your health care provider. Make sure you discuss any questions you have with your health care provider.  Document Released: 12/06/2012 Document Revised: 12/11/2017 Document Reviewed: 12/11/2017  [employer] Interactive Patient Education © 2018 [employer]        Return in about 3 months (around 11/16/2018) for Recheck.

## 2018-08-24 DIAGNOSIS — E11.9 TYPE 2 DIABETES MELLITUS WITHOUT COMPLICATION, WITHOUT LONG-TERM CURRENT USE OF INSULIN (HCC): Primary | ICD-10-CM

## 2018-08-24 DIAGNOSIS — E03.9 HYPOTHYROIDISM, UNSPECIFIED TYPE: ICD-10-CM

## 2018-08-24 RX ORDER — LEVOTHYROXINE SODIUM 0.15 MG/1
150 TABLET ORAL DAILY
Qty: 30 TABLET | Refills: 3 | Status: SHIPPED | OUTPATIENT
Start: 2018-08-24 | End: 2018-12-03 | Stop reason: SDUPTHER

## 2018-08-24 NOTE — PROGRESS NOTES
Pt called and informed of new diagnosis of Type 2 Diabetes Mellitus.  Plan to start metformin 500 mg daily and have pt follow up in the office within 2-3 weeks.  TSH also elevated, therefore will increase levothyroxine dose from 125 to 150 mcg per day.  Will discuss vitamin D deficiency and HLD at follow up.

## 2018-09-10 ENCOUNTER — OFFICE VISIT (OUTPATIENT)
Dept: FAMILY MEDICINE CLINIC | Facility: CLINIC | Age: 63
End: 2018-09-10

## 2018-09-10 VITALS
DIASTOLIC BLOOD PRESSURE: 90 MMHG | HEART RATE: 84 BPM | HEIGHT: 64 IN | BODY MASS INDEX: 40.46 KG/M2 | WEIGHT: 237 LBS | SYSTOLIC BLOOD PRESSURE: 126 MMHG | OXYGEN SATURATION: 98 % | RESPIRATION RATE: 13 BRPM

## 2018-09-10 DIAGNOSIS — E78.5 HYPERLIPIDEMIA, UNSPECIFIED HYPERLIPIDEMIA TYPE: ICD-10-CM

## 2018-09-10 DIAGNOSIS — E55.9 VITAMIN D DEFICIENCY: ICD-10-CM

## 2018-09-10 DIAGNOSIS — E11.9 TYPE 2 DIABETES MELLITUS WITHOUT COMPLICATION, WITHOUT LONG-TERM CURRENT USE OF INSULIN (HCC): Primary | ICD-10-CM

## 2018-09-10 DIAGNOSIS — Z23 NEED FOR VACCINATION: ICD-10-CM

## 2018-09-10 PROCEDURE — G0008 ADMIN INFLUENZA VIRUS VAC: HCPCS | Performed by: FAMILY MEDICINE

## 2018-09-10 PROCEDURE — 90732 PPSV23 VACC 2 YRS+ SUBQ/IM: CPT | Performed by: FAMILY MEDICINE

## 2018-09-10 PROCEDURE — 99214 OFFICE O/P EST MOD 30 MIN: CPT | Performed by: FAMILY MEDICINE

## 2018-09-10 PROCEDURE — 90686 IIV4 VACC NO PRSV 0.5 ML IM: CPT | Performed by: FAMILY MEDICINE

## 2018-09-10 PROCEDURE — G0009 ADMIN PNEUMOCOCCAL VACCINE: HCPCS | Performed by: FAMILY MEDICINE

## 2018-09-10 RX ORDER — ATORVASTATIN CALCIUM 20 MG/1
20 TABLET, FILM COATED ORAL DAILY
Qty: 30 TABLET | Refills: 4 | Status: SHIPPED | OUTPATIENT
Start: 2018-09-10 | End: 2018-12-03 | Stop reason: SDUPTHER

## 2018-09-10 RX ORDER — LANCETS 30 GAUGE
1 EACH MISCELLANEOUS DAILY
Qty: 100 EACH | Refills: 1 | Status: SHIPPED | OUTPATIENT
Start: 2018-09-10 | End: 2019-06-12 | Stop reason: SDUPTHER

## 2018-09-10 RX ORDER — BLOOD-GLUCOSE METER
1 KIT MISCELLANEOUS DAILY
Qty: 1 EACH | Refills: 0 | Status: SHIPPED | OUTPATIENT
Start: 2018-09-10

## 2018-09-10 NOTE — PATIENT INSTRUCTIONS
Please start checking your blood sugar each morning prior to breakfast.      iabetes Mellitus and Nutrition  When you have diabetes (diabetes mellitus), it is very important to have healthy eating habits because your blood sugar (glucose) levels are greatly affected by what you eat and drink. Eating healthy foods in the appropriate amounts, at about the same times every day, can help you:  · Control your blood glucose.  · Lower your risk of heart disease.  · Improve your blood pressure.  · Reach or maintain a healthy weight.    Every person with diabetes is different, and each person has different needs for a meal plan. Your health care provider may recommend that you work with a diet and nutrition specialist (dietitian) to make a meal plan that is best for you. Your meal plan may vary depending on factors such as:  · The calories you need.  · The medicines you take.  · Your weight.  · Your blood glucose, blood pressure, and cholesterol levels.  · Your activity level.  · Other health conditions you have, such as heart or kidney disease.    How do carbohydrates affect me?  Carbohydrates affect your blood glucose level more than any other type of food. Eating carbohydrates naturally increases the amount of glucose in your blood. Carbohydrate counting is a method for keeping track of how many carbohydrates you eat. Counting carbohydrates is important to keep your blood glucose at a healthy level, especially if you use insulin or take certain oral diabetes medicines.  It is important to know how many carbohydrates you can safely have in each meal. This is different for every person. Your dietitian can help you calculate how many carbohydrates you should have at each meal and for snack.  Foods that contain carbohydrates include:  · Bread, cereal, rice, pasta, and crackers.  · Potatoes and corn.  · Peas, beans, and lentils.  · Milk and yogurt.  · Fruit and juice.  · Desserts, such as cakes, cookies, ice cream, and  "candy.    How does alcohol affect me?  Alcohol can cause a sudden decrease in blood glucose (hypoglycemia), especially if you use insulin or take certain oral diabetes medicines. Hypoglycemia can be a life-threatening condition. Symptoms of hypoglycemia (sleepiness, dizziness, and confusion) are similar to symptoms of having too much alcohol.  If your health care provider says that alcohol is safe for you, follow these guidelines:  · Limit alcohol intake to no more than 1 drink per day for nonpregnant women and 2 drinks per day for men. One drink equals 12 oz of beer, 5 oz of wine, or 1½ oz of hard liquor.  · Do not drink on an empty stomach.  · Keep yourself hydrated with water, diet soda, or unsweetened iced tea.  · Keep in mind that regular soda, juice, and other mixers may contain a lot of sugar and must be counted as carbohydrates.    What are tips for following this plan?  Reading food labels  · Start by checking the serving size on the label. The amount of calories, carbohydrates, fats, and other nutrients listed on the label are based on one serving of the food. Many foods contain more than one serving per package.  · Check the total grams (g) of carbohydrates in one serving. You can calculate the number of servings of carbohydrates in one serving by dividing the total carbohydrates by 15. For example, if a food has 30 g of total carbohydrates, it would be equal to 2 servings of carbohydrates.  · Check the number of grams (g) of saturated and trans fats in one serving. Choose foods that have low or no amount of these fats.  · Check the number of milligrams (mg) of sodium in one serving. Most people should limit total sodium intake to less than 2,300 mg per day.  · Always check the nutrition information of foods labeled as \"low-fat\" or \"nonfat\". These foods may be higher in added sugar or refined carbohydrates and should be avoided.  · Talk to your dietitian to identify your daily goals for nutrients listed " on the label.  Shopping  · Avoid buying canned, premade, or processed foods. These foods tend to be high in fat, sodium, and added sugar.  · Shop around the outside edge of the grocery store. This includes fresh fruits and vegetables, bulk grains, fresh meats, and fresh dairy.  Cooking  · Use low-heat cooking methods, such as baking, instead of high-heat cooking methods like deep frying.  · Cook using healthy oils, such as olive, canola, or sunflower oil.  · Avoid cooking with butter, cream, or high-fat meats.  Meal planning  · Eat meals and snacks regularly, preferably at the same times every day. Avoid going long periods of time without eating.  · Eat foods high in fiber, such as fresh fruits, vegetables, beans, and whole grains. Talk to your dietitian about how many servings of carbohydrates you can eat at each meal.  · Eat 4-6 ounces of lean protein each day, such as lean meat, chicken, fish, eggs, or tofu. 1 ounce is equal to 1 ounce of meat, chicken, or fish, 1 egg, or 1/4 cup of tofu.  · Eat some foods each day that contain healthy fats, such as avocado, nuts, seeds, and fish.  Lifestyle    · Check your blood glucose regularly.  · Exercise at least 30 minutes 5 or more days each week, or as told by your health care provider.  · Take medicines as told by your health care provider.  · Do not use any products that contain nicotine or tobacco, such as cigarettes and e-cigarettes. If you need help quitting, ask your health care provider.  · Work with a counselor or diabetes educator to identify strategies to manage stress and any emotional and social challenges.  What are some questions to ask my health care provider?  · Do I need to meet with a diabetes educator?  · Do I need to meet with a dietitian?  · What number can I call if I have questions?  · When are the best times to check my blood glucose?  Where to find more information:  · American Diabetes Association: diabetes.org/food-and-fitness/food  · Academy  of Nutrition and Dietetics: www.eatright.org/resources/health/diseases-and-conditions/diabetes  · National [place] of Diabetes and Digestive and Kidney Diseases (NIH): www.niddk.nih.gov/health-information/diabetes/overview/diet-eating-physical-activity  Summary  · A healthy meal plan will help you control your blood glucose and maintain a healthy lifestyle.  · Working with a diet and nutrition specialist (dietitian) can help you make a meal plan that is best for you.  · Keep in mind that carbohydrates and alcohol have immediate effects on your blood glucose levels. It is important to count carbohydrates and to use alcohol carefully.  This information is not intended to replace advice given to you by your health care provider. Make sure you discuss any questions you have with your health care provider.  Document Released: 09/14/2006 Document Revised: 01/22/2018 Document Reviewed: 01/22/2018  [employer] Interactive Patient Education © 2018 [employer]

## 2018-09-10 NOTE — PROGRESS NOTES
Subjective   Nehal Diaz is a 62 y.o. female.     Chief Complaint   Patient presents with   • Diabetes   • Hyperlipidemia       HPI     T2DM:  -new diagnosis with HgB A1C 6.73% about 3 weeks ago  -tolerating metformin 500 mg daily well   -eye exam UTD with Dr. Segun Morton  -walking for exercise most days  -keeping a food journal  -lost 8 lb intentionally over the last 3 weeks  -pt agrees to get flu and pneuomovax shots today    HLD:  -total cholesterol 212, HDL 40, , and triglycerides 265 about 3 weeks ago  -no chest pain, dizziness, or SOA    Vitamin D level low at 18.4 on most recent labs     /86 on repeat, but pt hasn't had her ramipril yet today       Review of Systems   Constitutional: Negative for fatigue and fever.   HENT: Negative for congestion and sore throat.    Respiratory: Negative for cough and shortness of breath.    Cardiovascular: Negative for chest pain and palpitations.   Gastrointestinal: Negative for abdominal pain, diarrhea and nausea.   Skin: Negative for rash and wound.   Neurological: Negative for dizziness and headaches.   Psychiatric/Behavioral: Positive for dysphoric mood (improving). The patient is not nervous/anxious.        The following portions of the patient's history were reviewed and updated as appropriate: allergies, current medications, past family history, past medical history, past social history, past surgical history and problem list.    Past Medical History:   Diagnosis Date   • Allergic     seasonal   • Arthritis    • Cancer (CMS/HCC)     skin, followed by Dermatology   • Cataract    • Cerumen impaction    • Cholelithiasis    • Depression    • GERD (gastroesophageal reflux disease)    • Hyperlipidemia    • Hypertension    • Hypothyroidism    • Kidney stone    • Obesity    • Osteoporosis    • Rheumatoid arthritis (CMS/HCC)     followed by Rheumatology, Dr. Watkins   • Seborrheic dermatitis of scalp    • Type 2 diabetes mellitus (CMS/[facility])    • Visual impairment     • Vitamin D deficiency      Past Surgical History:   Procedure Laterality Date   • APPENDECTOMY     • CATARACT EXTRACTION     • CHOLECYSTECTOMY     • COLONOSCOPY     • EYE SURGERY     • HYSTERECTOMY     • SKIN CANCER EXCISION       Family History   Problem Relation Age of Onset   • Cancer Mother         breast and renal cell   • Kidney disease Mother    • Hyperlipidemia Mother    • Hypertension Mother    • Alcohol abuse Father    • Arthritis Father    • Cancer Father         lung   • Heart disease Father    • Hypertension Father    • Cancer Sister         ovarian   • Diabetes Sister    • Hypertension Sister    • Thyroid disease Sister    • Cancer Brother    • Arthritis Maternal Aunt    • Cancer Maternal Aunt         breast   • Mental illness Maternal Aunt    • Hyperlipidemia Maternal Aunt    • Hypertension Maternal Aunt    • Alcohol abuse Maternal Uncle    • Cancer Maternal Uncle         prostate   • Heart disease Maternal Uncle    • Arthritis Paternal Aunt    • Cancer Paternal Aunt         ovarian   • Diabetes Maternal Grandfather    • Miscarriages / Stillbirths Paternal Grandmother    • Cancer Paternal Grandfather         prostate   • Cancer Sister         uterine   • Cancer Sister         colon   • Cancer Maternal Aunt         colon     Social History       Social History Main Topics   • Smoking status: Never Smoker   • Smokeless tobacco: Never Used   • Alcohol use Yes      Comment: socially, rarely   • Drug use: No   • Sexual activity: No     Social History Narrative    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  Lives at home with her .          Allergies   Allergen Reactions   • Codeine Nausea And Vomiting        Outpatient Medications Prior to Visit   Medication Sig Dispense Refill   • abatacept (ORENCIA) 250 MG injection Infuse  into a venous catheter.     • diclofenac (VOLTAREN) 1 % gel gel VICKEY 4 GRAMS TO AFFECTED JOINTS QID PRN  3   • FLUOCINOLONE ACETONIDE SCALP  0.01 % oil 0.01 application.  0   • ketoconazole (NIZORAL) 2 % shampoo Apply 120 application topically 2 (Two) Times a Week.  0   • leflunomide (ARAVA) 20 MG tablet Take 20 mg by mouth Daily.     • levothyroxine (SYNTHROID, LEVOTHROID) 150 MCG tablet Take 1 tablet by mouth Daily. 30 tablet 3   • metFORMIN (GLUCOPHAGE) 500 MG tablet Take 1 tablet by mouth Daily With Breakfast. 30 tablet 1   • naproxen (NAPROSYN) 500 MG tablet Take 500 mg by mouth 2 (Two) Times a Day With Meals.     • ramipril (ALTACE) 10 MG capsule Take 1 capsule by mouth Daily. 30 capsule 11   • timolol (TIMOPTIC) 0.25 % ophthalmic solution 1 drop 2 (Two) Times a Day.     • timolol (TIMOPTIC) 0.5 % ophthalmic solution INSTILL 1 DROP IN BOTH EYES QD.  11     No facility-administered medications prior to visit.        Objective     Vitals:    09/10/18 1308   BP: 126/90   Pulse: 84   Resp: 13   SpO2: 98%   /86 on repeat  BMI 40    Physical Exam   Constitutional: She appears well-developed and well-nourished. No distress.   HENT:   Head: Normocephalic and atraumatic.   Neck: No thyromegaly present.   Cardiovascular: Normal rate, regular rhythm and normal heart sounds.  Exam reveals no gallop and no friction rub.    No murmur heard.  Pulmonary/Chest: Effort normal and breath sounds normal. No respiratory distress. She has no wheezes. She has no rhonchi. She has no rales.   Abdominal: Soft. Bowel sounds are normal. She exhibits no distension. There is no tenderness.   Lymphadenopathy:     She has no cervical adenopathy.   Skin: Skin is warm and dry.   Psychiatric: She has a normal mood and affect. Her behavior is normal.       ASSESSMENT/PLAN       Problem List Items Addressed This Visit        Cardiovascular and Mediastinum    Hyperlipidemia    Relevant Medications    Start atorvastatin (LIPITOR) 20 MG tablet  Repeat fasting lipids in 2-3 months       Digestive    Vitamin D deficiency    Relevant Medications    Start Cholecalciferol (VITAMIN D)  1000 units tablet       Endocrine    Type 2 diabetes mellitus (CMS/[facility]) - Primary    Relevant Medications    glucose blood test strip    glucose monitor monitoring kit    Lancets misc  Start checking AM fasting blood sugar and keeping a home log    Other Relevant Orders    Ambulatory Referral to Nutrition Services  Continue metformin, ACE, and start statin for CV risk reduction  Pneumovax today  Eye exam UTD  Plan for microalbumin, repeat A1C, and foot exam at follow up      Other Visit Diagnoses     Need for vaccination        Relevant Orders    Fluarix/Fluzone/Afluria Quad>6mo ([id number])    Pneumococcal Polysaccharide Vaccine 23-Valent (PPSV23) Greater Than or Equal To 1yo Subcutaneous / IM            Patient Instructions   Please start checking your blood sugar each morning prior to breakfast.      iabetes Mellitus and Nutrition  When you have diabetes (diabetes mellitus), it is very important to have healthy eating habits because your blood sugar (glucose) levels are greatly affected by what you eat and drink. Eating healthy foods in the appropriate amounts, at about the same times every day, can help you:  · Control your blood glucose.  · Lower your risk of heart disease.  · Improve your blood pressure.  · Reach or maintain a healthy weight.    Every person with diabetes is different, and each person has different needs for a meal plan. Your health care provider may recommend that you work with a diet and nutrition specialist (dietitian) to make a meal plan that is best for you. Your meal plan may vary depending on factors such as:  · The calories you need.  · The medicines you take.  · Your weight.  · Your blood glucose, blood pressure, and cholesterol levels.  · Your activity level.  · Other health conditions you have, such as heart or kidney disease.    How do carbohydrates affect me?  Carbohydrates affect your blood glucose level more than any other type of food. Eating carbohydrates naturally increases  the amount of glucose in your blood. Carbohydrate counting is a method for keeping track of how many carbohydrates you eat. Counting carbohydrates is important to keep your blood glucose at a healthy level, especially if you use insulin or take certain oral diabetes medicines.  It is important to know how many carbohydrates you can safely have in each meal. This is different for every person. Your dietitian can help you calculate how many carbohydrates you should have at each meal and for snack.  Foods that contain carbohydrates include:  · Bread, cereal, rice, pasta, and crackers.  · Potatoes and corn.  · Peas, beans, and lentils.  · Milk and yogurt.  · Fruit and juice.  · Desserts, such as cakes, cookies, ice cream, and candy.    How does alcohol affect me?  Alcohol can cause a sudden decrease in blood glucose (hypoglycemia), especially if you use insulin or take certain oral diabetes medicines. Hypoglycemia can be a life-threatening condition. Symptoms of hypoglycemia (sleepiness, dizziness, and confusion) are similar to symptoms of having too much alcohol.  If your health care provider says that alcohol is safe for you, follow these guidelines:  · Limit alcohol intake to no more than 1 drink per day for nonpregnant women and 2 drinks per day for men. One drink equals 12 oz of beer, 5 oz of wine, or 1½ oz of hard liquor.  · Do not drink on an empty stomach.  · Keep yourself hydrated with water, diet soda, or unsweetened iced tea.  · Keep in mind that regular soda, juice, and other mixers may contain a lot of sugar and must be counted as carbohydrates.    What are tips for following this plan?  Reading food labels  · Start by checking the serving size on the label. The amount of calories, carbohydrates, fats, and other nutrients listed on the label are based on one serving of the food. Many foods contain more than one serving per package.  · Check the total grams (g) of carbohydrates in one serving. You can  "calculate the number of servings of carbohydrates in one serving by dividing the total carbohydrates by 15. For example, if a food has 30 g of total carbohydrates, it would be equal to 2 servings of carbohydrates.  · Check the number of grams (g) of saturated and trans fats in one serving. Choose foods that have low or no amount of these fats.  · Check the number of milligrams (mg) of sodium in one serving. Most people should limit total sodium intake to less than 2,300 mg per day.  · Always check the nutrition information of foods labeled as \"low-fat\" or \"nonfat\". These foods may be higher in added sugar or refined carbohydrates and should be avoided.  · Talk to your dietitian to identify your daily goals for nutrients listed on the label.  Shopping  · Avoid buying canned, premade, or processed foods. These foods tend to be high in fat, sodium, and added sugar.  · Shop around the outside edge of the grocery store. This includes fresh fruits and vegetables, bulk grains, fresh meats, and fresh dairy.  Cooking  · Use low-heat cooking methods, such as baking, instead of high-heat cooking methods like deep frying.  · Cook using healthy oils, such as olive, canola, or sunflower oil.  · Avoid cooking with butter, cream, or high-fat meats.  Meal planning  · Eat meals and snacks regularly, preferably at the same times every day. Avoid going long periods of time without eating.  · Eat foods high in fiber, such as fresh fruits, vegetables, beans, and whole grains. Talk to your dietitian about how many servings of carbohydrates you can eat at each meal.  · Eat 4-6 ounces of lean protein each day, such as lean meat, chicken, fish, eggs, or tofu. 1 ounce is equal to 1 ounce of meat, chicken, or fish, 1 egg, or 1/4 cup of tofu.  · Eat some foods each day that contain healthy fats, such as avocado, nuts, seeds, and fish.  Lifestyle    · Check your blood glucose regularly.  · Exercise at least 30 minutes 5 or more days each week, " or as told by your health care provider.  · Take medicines as told by your health care provider.  · Do not use any products that contain nicotine or tobacco, such as cigarettes and e-cigarettes. If you need help quitting, ask your health care provider.  · Work with a counselor or diabetes educator to identify strategies to manage stress and any emotional and social challenges.  What are some questions to ask my health care provider?  · Do I need to meet with a diabetes educator?  · Do I need to meet with a dietitian?  · What number can I call if I have questions?  · When are the best times to check my blood glucose?  Where to find more information:  · American Diabetes Association: diabetes.org/food-and-fitness/food  · Academy of Nutrition and Dietetics: www.eatright.org/resources/health/diseases-and-conditions/diabetes  · National [place] of Diabetes and Digestive and Kidney Diseases (NIH): www.niddk.nih.gov/health-information/diabetes/overview/diet-eating-physical-activity  Summary  · A healthy meal plan will help you control your blood glucose and maintain a healthy lifestyle.  · Working with a diet and nutrition specialist (dietitian) can help you make a meal plan that is best for you.  · Keep in mind that carbohydrates and alcohol have immediate effects on your blood glucose levels. It is important to count carbohydrates and to use alcohol carefully.  This information is not intended to replace advice given to you by your health care provider. Make sure you discuss any questions you have with your health care provider.  Document Released: 09/14/2006 Document Revised: 01/22/2018 Document Reviewed: 01/22/2018  [employer] Patient Education © 2018 [employer]      Return for follow up as scheduled 11/16/18.      Jannet Tripp MD  09/10/18

## 2018-10-02 ENCOUNTER — APPOINTMENT (OUTPATIENT)
Dept: DIABETES SERVICES | Facility: HOSPITAL | Age: 63
End: 2018-10-02

## 2018-10-09 ENCOUNTER — APPOINTMENT (OUTPATIENT)
Dept: DIABETES SERVICES | Facility: HOSPITAL | Age: 63
End: 2018-10-09

## 2018-10-16 ENCOUNTER — APPOINTMENT (OUTPATIENT)
Dept: DIABETES SERVICES | Facility: HOSPITAL | Age: 63
End: 2018-10-16

## 2018-10-17 DIAGNOSIS — E11.9 TYPE 2 DIABETES MELLITUS WITHOUT COMPLICATION, WITHOUT LONG-TERM CURRENT USE OF INSULIN (HCC): ICD-10-CM

## 2018-11-04 DIAGNOSIS — E11.9 TYPE 2 DIABETES MELLITUS WITHOUT COMPLICATION, WITHOUT LONG-TERM CURRENT USE OF INSULIN (HCC): ICD-10-CM

## 2018-12-03 ENCOUNTER — OFFICE VISIT (OUTPATIENT)
Dept: FAMILY MEDICINE CLINIC | Facility: CLINIC | Age: 63
End: 2018-12-03

## 2018-12-03 VITALS
WEIGHT: 226 LBS | SYSTOLIC BLOOD PRESSURE: 126 MMHG | HEIGHT: 64 IN | RESPIRATION RATE: 13 BRPM | BODY MASS INDEX: 38.58 KG/M2 | DIASTOLIC BLOOD PRESSURE: 86 MMHG | HEART RATE: 92 BPM | OXYGEN SATURATION: 98 %

## 2018-12-03 DIAGNOSIS — E03.9 ACQUIRED HYPOTHYROIDISM: ICD-10-CM

## 2018-12-03 DIAGNOSIS — I10 ESSENTIAL HYPERTENSION: ICD-10-CM

## 2018-12-03 DIAGNOSIS — E78.5 HYPERLIPIDEMIA, UNSPECIFIED HYPERLIPIDEMIA TYPE: ICD-10-CM

## 2018-12-03 DIAGNOSIS — Z23 NEED FOR VACCINATION: ICD-10-CM

## 2018-12-03 DIAGNOSIS — E03.9 HYPOTHYROIDISM, UNSPECIFIED TYPE: ICD-10-CM

## 2018-12-03 DIAGNOSIS — E11.9 TYPE 2 DIABETES MELLITUS WITHOUT COMPLICATION, WITHOUT LONG-TERM CURRENT USE OF INSULIN (HCC): Primary | ICD-10-CM

## 2018-12-03 DIAGNOSIS — L84 CALLUS OF FOOT: ICD-10-CM

## 2018-12-03 LAB
ALBUMIN SERPL-MCNC: 4.3 G/DL (ref 3.5–5.2)
ALBUMIN/GLOB SERPL: 1.4 G/DL
ALP SERPL-CCNC: 114 U/L (ref 39–117)
ALT SERPL-CCNC: 21 U/L (ref 1–33)
AST SERPL-CCNC: 16 U/L (ref 1–32)
BILIRUB SERPL-MCNC: 0.9 MG/DL (ref 0.1–1.2)
BUN SERPL-MCNC: 16 MG/DL (ref 8–23)
BUN/CREAT SERPL: 23.2 (ref 7–25)
CALCIUM SERPL-MCNC: 10.4 MG/DL (ref 8.6–10.5)
CHLORIDE SERPL-SCNC: 101 MMOL/L (ref 98–107)
CHOLEST SERPL-MCNC: 148 MG/DL (ref 0–200)
CO2 SERPL-SCNC: 26.7 MMOL/L (ref 22–29)
CREAT SERPL-MCNC: 0.69 MG/DL (ref 0.57–1)
GLOBULIN SER CALC-MCNC: 3 GM/DL
GLUCOSE SERPL-MCNC: 125 MG/DL (ref 65–99)
HBA1C MFR BLD: 6.1 % (ref 4.8–5.6)
HDLC SERPL-MCNC: 45 MG/DL (ref 40–60)
LDLC SERPL CALC-MCNC: 75 MG/DL (ref 0–100)
POTASSIUM SERPL-SCNC: 4.5 MMOL/L (ref 3.5–5.2)
PROT SERPL-MCNC: 7.3 G/DL (ref 6–8.5)
SODIUM SERPL-SCNC: 141 MMOL/L (ref 136–145)
TRIGL SERPL-MCNC: 138 MG/DL (ref 0–150)
TSH SERPL DL<=0.005 MIU/L-ACNC: 1.57 MIU/ML (ref 0.27–4.2)
VLDLC SERPL CALC-MCNC: 27.6 MG/DL (ref 5–40)

## 2018-12-03 PROCEDURE — 99214 OFFICE O/P EST MOD 30 MIN: CPT | Performed by: FAMILY MEDICINE

## 2018-12-03 PROCEDURE — 90471 IMMUNIZATION ADMIN: CPT | Performed by: FAMILY MEDICINE

## 2018-12-03 PROCEDURE — 90632 HEPA VACCINE ADULT IM: CPT | Performed by: FAMILY MEDICINE

## 2018-12-03 RX ORDER — LEVOTHYROXINE SODIUM 0.15 MG/1
150 TABLET ORAL DAILY
Qty: 30 TABLET | Refills: 5 | Status: SHIPPED | OUTPATIENT
Start: 2018-12-03 | End: 2019-07-02 | Stop reason: SDUPTHER

## 2018-12-03 RX ORDER — RAMIPRIL 10 MG/1
10 CAPSULE ORAL DAILY
Qty: 30 CAPSULE | Refills: 5 | Status: SHIPPED | OUTPATIENT
Start: 2018-12-03 | End: 2019-07-02 | Stop reason: SDUPTHER

## 2018-12-03 RX ORDER — ATORVASTATIN CALCIUM 20 MG/1
20 TABLET, FILM COATED ORAL DAILY
Qty: 30 TABLET | Refills: 5 | Status: SHIPPED | OUTPATIENT
Start: 2018-12-03 | End: 2019-07-02 | Stop reason: SDUPTHER

## 2018-12-10 ENCOUNTER — TELEPHONE (OUTPATIENT)
Dept: FAMILY MEDICINE CLINIC | Facility: CLINIC | Age: 63
End: 2018-12-10

## 2018-12-10 NOTE — TELEPHONE ENCOUNTER
Called and spoke with patient to let her know lab results and to continue with current medication regimen. She was ectatic to hear how well her numbers have come down and she said to tell Dr. Tripp how much she loves her.      ----- Message from Jannet Tripp MD sent at 12/9/2018  3:52 PM EST -----  Please let pt know that her diabetes, cholesterol, and hypothyroidism are all well controlled.  She should continue her current medication regimen.

## 2019-01-02 NOTE — TELEPHONE ENCOUNTER
Per ANW  , Increase Levothyroxine to 125mcg and repeat in 3 mos   Telephone Encounter by Yesenia Diana NCMA at 01/16/17 12:02 PM     Author:  Yesenia Diana NCMA Service:  (none) Author Type:  Certified Medical Assistant     Filed:  01/16/17 12:05 PM Encounter Date:  1/14/2017 Status:  Signed     :  Yesenia Diana NCMA (Certified Medical Assistant)            Rx(s) sent via e-prescribing to the pharmacy.[DG1.1T]     Electronically Signed by:    STEF Velez , 1/16/2017[DG1.2T]        Revision History        User Key Date/Time User Provider Type Action    > DG1.2 01/16/17 12:05 PM Yesenia Diana NCMA Certified Medical Assistant Sign     DG1.1 01/16/17 12:02 PM Yesenia Diana NCMA Certified Medical Assistant     T - Template

## 2019-03-20 RX ORDER — BLOOD-GLUCOSE METER
EACH MISCELLANEOUS
Qty: 1 KIT | Refills: 0 | Status: SHIPPED | OUTPATIENT
Start: 2019-03-20

## 2019-03-21 DIAGNOSIS — E11.9 TYPE 2 DIABETES MELLITUS WITHOUT COMPLICATION, WITHOUT LONG-TERM CURRENT USE OF INSULIN (HCC): ICD-10-CM

## 2019-05-28 ENCOUNTER — APPOINTMENT (OUTPATIENT)
Dept: WOMENS IMAGING | Facility: HOSPITAL | Age: 64
End: 2019-05-28

## 2019-05-28 PROCEDURE — 77067 SCR MAMMO BI INCL CAD: CPT | Performed by: RADIOLOGY

## 2019-05-28 PROCEDURE — 77063 BREAST TOMOSYNTHESIS BI: CPT | Performed by: RADIOLOGY

## 2019-06-12 DIAGNOSIS — E11.9 TYPE 2 DIABETES MELLITUS WITHOUT COMPLICATION, WITHOUT LONG-TERM CURRENT USE OF INSULIN (HCC): ICD-10-CM

## 2019-06-12 RX ORDER — LANCETS
EACH MISCELLANEOUS
Qty: 100 EACH | Refills: 1 | Status: SHIPPED | OUTPATIENT
Start: 2019-06-12

## 2019-07-02 ENCOUNTER — OFFICE VISIT (OUTPATIENT)
Dept: FAMILY MEDICINE CLINIC | Facility: CLINIC | Age: 64
End: 2019-07-02

## 2019-07-02 VITALS
HEIGHT: 64 IN | OXYGEN SATURATION: 98 % | WEIGHT: 226 LBS | DIASTOLIC BLOOD PRESSURE: 80 MMHG | SYSTOLIC BLOOD PRESSURE: 140 MMHG | TEMPERATURE: 98 F | RESPIRATION RATE: 17 BRPM | BODY MASS INDEX: 38.58 KG/M2 | HEART RATE: 98 BPM

## 2019-07-02 DIAGNOSIS — E03.9 HYPOTHYROIDISM, UNSPECIFIED TYPE: ICD-10-CM

## 2019-07-02 DIAGNOSIS — J30.1 CHRONIC SEASONAL ALLERGIC RHINITIS DUE TO POLLEN: ICD-10-CM

## 2019-07-02 DIAGNOSIS — K21.9 GASTROESOPHAGEAL REFLUX DISEASE, ESOPHAGITIS PRESENCE NOT SPECIFIED: ICD-10-CM

## 2019-07-02 DIAGNOSIS — E11.9 TYPE 2 DIABETES MELLITUS WITHOUT COMPLICATION, WITHOUT LONG-TERM CURRENT USE OF INSULIN (HCC): ICD-10-CM

## 2019-07-02 DIAGNOSIS — E03.9 ACQUIRED HYPOTHYROIDISM: ICD-10-CM

## 2019-07-02 DIAGNOSIS — E55.9 VITAMIN D DEFICIENCY: ICD-10-CM

## 2019-07-02 DIAGNOSIS — M06.9 RHEUMATOID ARTHRITIS INVOLVING MULTIPLE SITES, UNSPECIFIED RHEUMATOID FACTOR PRESENCE: ICD-10-CM

## 2019-07-02 DIAGNOSIS — I10 ESSENTIAL HYPERTENSION: ICD-10-CM

## 2019-07-02 DIAGNOSIS — E66.09 CLASS 2 OBESITY DUE TO EXCESS CALORIES WITHOUT SERIOUS COMORBIDITY WITH BODY MASS INDEX (BMI) OF 38.0 TO 38.9 IN ADULT: ICD-10-CM

## 2019-07-02 DIAGNOSIS — E78.5 HYPERLIPIDEMIA, UNSPECIFIED HYPERLIPIDEMIA TYPE: Primary | ICD-10-CM

## 2019-07-02 PROCEDURE — 99214 OFFICE O/P EST MOD 30 MIN: CPT | Performed by: INTERNAL MEDICINE

## 2019-07-02 RX ORDER — LEVOTHYROXINE SODIUM 0.15 MG/1
150 TABLET ORAL DAILY
Qty: 90 TABLET | Refills: 3 | Status: SHIPPED | OUTPATIENT
Start: 2019-07-02 | End: 2020-07-10 | Stop reason: SDUPTHER

## 2019-07-02 RX ORDER — RAMIPRIL 10 MG/1
10 CAPSULE ORAL DAILY
Qty: 90 CAPSULE | Refills: 3 | Status: SHIPPED | OUTPATIENT
Start: 2019-07-02 | End: 2020-07-10 | Stop reason: SDUPTHER

## 2019-07-02 RX ORDER — ATORVASTATIN CALCIUM 20 MG/1
20 TABLET, FILM COATED ORAL DAILY
Qty: 90 TABLET | Refills: 3 | Status: SHIPPED | OUTPATIENT
Start: 2019-07-02 | End: 2020-04-13

## 2019-07-02 NOTE — PROGRESS NOTES
Subjective   Nehal Diaz is a 63 y.o. female. Patient is here today for establishing care as a new patient.  She is generally feeling well now.  She is retired.  She has known problems of hypertension, hyperlipidemia, diabetes mellitus type 2, hypothyroidism and rheumatoid arthritis.  PMH-status post tonsillectomy, appendectomy, cholecystectomy and complete hysterectomy.  She sees Dr. Marko Watkins for rheumatoid arthritis and is on infusion  SH-the patient's , retired, does not use tobacco products and rarely has any alcohol  FH-patient's father  at age 60 of a heart attack.  Mother  at age 83 of bladder cancer  Chief Complaint   Patient presents with   • Establish Care   • Diabetes   • Hypothyroidism          Vitals:    19 0904   BP: 140/80   Pulse: 98   Resp: 17   Temp: 98 °F (36.7 °C)   SpO2: 98%     The following portions of the patient's history were reviewed and updated as appropriate: allergies, current medications, past family history, past medical history, past social history, past surgical history and problem list.    Past Medical History:   Diagnosis Date   • Allergic     seasonal   • Arthritis    • Cancer (CMS/HCC)     skin, followed by Dermatology   • Cataract    • Cerumen impaction    • Cholelithiasis    • Depression     followed by Psychiatry Nisha CRAWFORD   • GERD (gastroesophageal reflux disease)    • Hyperlipidemia    • Hypertension    • Hypothyroidism    • Kidney stone    • Obesity    • Osteoporosis    • Rheumatoid arthritis (CMS/HCC)     followed by Rheumatology, Dr. Watkins   • Seborrheic dermatitis of scalp    • Type 2 diabetes mellitus (CMS/HCC)    • Visual impairment    • Vitamin D deficiency       Allergies   Allergen Reactions   • Codeine Nausea And Vomiting      Social History     Socioeconomic History   • Marital status: Unknown     Spouse name: Not on file   • Number of children: Not on file   • Years of education: Not on file   • Highest education level: Not on  file   Tobacco Use   • Smoking status: Never Smoker   • Smokeless tobacco: Never Used   Substance and Sexual Activity   • Alcohol use: Yes     Comment: socially, rarely   • Drug use: No   • Sexual activity: No   Social History Narrative    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  Lives at home with her .          Current Outpatient Medications:   •  abatacept (ORENCIA) 250 MG injection, Infuse  into a venous catheter., Disp: , Rfl:   •  ACCU-CHEK SOFTCLIX LANCETS lancets, TEST SUGAR DAILY, Disp: 100 each, Rfl: 1  •  atorvastatin (LIPITOR) 20 MG tablet, Take 1 tablet by mouth Daily., Disp: 90 tablet, Rfl: 3  •  Blood Glucose Monitoring Suppl (ACCU-CHEK CHARISSA PLUS) w/Device kit, TEST DAILY, Disp: 1 kit, Rfl: 0  •  Cholecalciferol (VITAMIN D) 1000 units tablet, Take 1 tablet by mouth Daily., Disp: 30 tablet, Rfl: 5  •  diclofenac (VOLTAREN) 1 % gel gel, VICKEY 4 GRAMS TO AFFECTED JOINTS QID PRN, Disp: , Rfl: 3  •  glucose blood (ACCU-CHEK CHARISSA PLUS) test strip, TEST SUGAR DAILY, Disp: 100 each, Rfl: 3  •  glucose monitor monitoring kit, 1 each Daily., Disp: 1 each, Rfl: 0  •  leflunomide (ARAVA) 20 MG tablet, Take 20 mg by mouth Daily., Disp: , Rfl:   •  levothyroxine (SYNTHROID, LEVOTHROID) 150 MCG tablet, Take 1 tablet by mouth Daily., Disp: 90 tablet, Rfl: 3  •  metFORMIN (GLUCOPHAGE) 500 MG tablet, Take 1 tablet by mouth Daily With Breakfast., Disp: 90 tablet, Rfl: 3  •  naproxen (NAPROSYN) 500 MG tablet, Take 500 mg by mouth 2 (Two) Times a Day With Meals., Disp: , Rfl:   •  ramipril (ALTACE) 10 MG capsule, Take 1 capsule by mouth Daily., Disp: 90 capsule, Rfl: 3  •  timolol (TIMOPTIC) 0.25 % ophthalmic solution, 1 drop 2 (Two) Times a Day., Disp: , Rfl:   •  timolol (TIMOPTIC) 0.5 % ophthalmic solution, INSTILL 1 DROP IN BOTH EYES QD., Disp: , Rfl: 11     Objective     History of Present Illness     Review of Systems   Constitutional: Negative.    HENT: Negative.    Eyes:  Negative.    Respiratory: Negative.    Cardiovascular: Negative.    Gastrointestinal: Negative.    Genitourinary: Negative.    Musculoskeletal: Positive for arthralgias.   Skin: Negative.    Neurological: Negative.    Psychiatric/Behavioral: Negative.        Physical Exam   Constitutional: She is oriented to person, place, and time. She appears well-developed and well-nourished.   Pleasant, cooperative no distress, blood pressure 130/80   HENT:   Head: Normocephalic and atraumatic.   Eyes: Conjunctivae are normal. Pupils are equal, round, and reactive to light. No scleral icterus.   Neck: Normal range of motion. Neck supple.   Cardiovascular: Normal rate, regular rhythm and normal heart sounds.   Pulmonary/Chest: Effort normal and breath sounds normal. No respiratory distress. She has no wheezes. She has no rales.   Musculoskeletal: Normal range of motion. She exhibits no edema.   Neurological: She is alert and oriented to person, place, and time.   Skin: Skin is warm and dry.   Psychiatric: She has a normal mood and affect. Her behavior is normal.   Nursing note and vitals reviewed.      ASSESSMENT overall the patient seems stable.  Her blood pressure is controlled and heart and lungs sound fine.  She is up-to-date on gynecologic visits.     Problem List Items Addressed This Visit        Cardiovascular and Mediastinum    Hypertension    Relevant Medications    ramipril (ALTACE) 10 MG capsule    Hyperlipidemia - Primary    Relevant Medications    atorvastatin (LIPITOR) 20 MG tablet       Respiratory    Chronic seasonal allergic rhinitis due to pollen       Digestive    GERD (gastroesophageal reflux disease)    Obesity    Vitamin D deficiency       Endocrine    Hypothyroidism    Relevant Medications    levothyroxine (SYNTHROID, LEVOTHROID) 150 MCG tablet    Type 2 diabetes mellitus (CMS/HCC)    Relevant Medications    metFORMIN (GLUCOPHAGE) 500 MG tablet       Musculoskeletal and Integument    Rheumatoid arthritis  (CMS/[facility])          PLAN I recommended the shingles immunizations.  I will see the patient back in 3 months with a CBC, CMP, lipid panel, hemoglobin A1c, TSH and free T4 and vitamin D level, urine microalbumin    There are no Patient Instructions on file for this visit.  Return in about 3 months (around 10/2/2019) for with labs.

## 2019-10-22 DIAGNOSIS — E11.9 TYPE 2 DIABETES MELLITUS WITHOUT COMPLICATION, WITHOUT LONG-TERM CURRENT USE OF INSULIN (HCC): ICD-10-CM

## 2019-10-22 DIAGNOSIS — E78.5 HYPERLIPIDEMIA, UNSPECIFIED HYPERLIPIDEMIA TYPE: ICD-10-CM

## 2019-10-22 DIAGNOSIS — E03.9 ACQUIRED HYPOTHYROIDISM: ICD-10-CM

## 2019-10-22 DIAGNOSIS — E55.9 VITAMIN D DEFICIENCY: ICD-10-CM

## 2019-10-26 LAB
25(OH)D3+25(OH)D2 SERPL-MCNC: 21.8 NG/ML (ref 30–100)
ALBUMIN SERPL-MCNC: 4.3 G/DL (ref 3.5–5.2)
ALBUMIN/GLOB SERPL: 1.5 G/DL
ALP SERPL-CCNC: 98 U/L (ref 39–117)
ALT SERPL-CCNC: 18 U/L (ref 1–33)
AST SERPL-CCNC: 17 U/L (ref 1–32)
BASOPHILS # BLD AUTO: 0.05 10*3/MM3 (ref 0–0.2)
BASOPHILS NFR BLD AUTO: 0.7 % (ref 0–1.5)
BILIRUB SERPL-MCNC: 0.7 MG/DL (ref 0.2–1.2)
BUN SERPL-MCNC: 13 MG/DL (ref 8–23)
BUN/CREAT SERPL: 17.8 (ref 7–25)
CALCIUM SERPL-MCNC: 9.8 MG/DL (ref 8.6–10.5)
CHLORIDE SERPL-SCNC: 103 MMOL/L (ref 98–107)
CHOLEST SERPL-MCNC: 137 MG/DL (ref 0–200)
CO2 SERPL-SCNC: 28 MMOL/L (ref 22–29)
CREAT SERPL-MCNC: 0.73 MG/DL (ref 0.57–1)
EOSINOPHIL # BLD AUTO: 0.07 10*3/MM3 (ref 0–0.4)
EOSINOPHIL NFR BLD AUTO: 1 % (ref 0.3–6.2)
ERYTHROCYTE [DISTWIDTH] IN BLOOD BY AUTOMATED COUNT: 13.4 % (ref 12.3–15.4)
GLOBULIN SER CALC-MCNC: 2.8 GM/DL
GLUCOSE SERPL-MCNC: 113 MG/DL (ref 65–99)
HBA1C MFR BLD: 6.7 % (ref 4.8–5.6)
HCT VFR BLD AUTO: 40.7 % (ref 34–46.6)
HDLC SERPL-MCNC: 44 MG/DL (ref 40–60)
HGB BLD-MCNC: 13.7 G/DL (ref 12–15.9)
IMM GRANULOCYTES # BLD AUTO: 0.01 10*3/MM3 (ref 0–0.05)
IMM GRANULOCYTES NFR BLD AUTO: 0.1 % (ref 0–0.5)
LDLC SERPL CALC-MCNC: 71 MG/DL (ref 0–100)
LDLC/HDLC SERPL: 1.6 {RATIO}
LYMPHOCYTES # BLD AUTO: 2.48 10*3/MM3 (ref 0.7–3.1)
LYMPHOCYTES NFR BLD AUTO: 34.8 % (ref 19.6–45.3)
MCH RBC QN AUTO: 28.7 PG (ref 26.6–33)
MCHC RBC AUTO-ENTMCNC: 33.7 G/DL (ref 31.5–35.7)
MCV RBC AUTO: 85.3 FL (ref 79–97)
MICROALBUMIN UR-MCNC: 5 UG/ML
MONOCYTES # BLD AUTO: 0.58 10*3/MM3 (ref 0.1–0.9)
MONOCYTES NFR BLD AUTO: 8.1 % (ref 5–12)
NEUTROPHILS # BLD AUTO: 3.93 10*3/MM3 (ref 1.7–7)
NEUTROPHILS NFR BLD AUTO: 55.3 % (ref 42.7–76)
NRBC BLD AUTO-RTO: 0 /100 WBC (ref 0–0.2)
PLATELET # BLD AUTO: 191 10*3/MM3 (ref 140–450)
POTASSIUM SERPL-SCNC: 4.2 MMOL/L (ref 3.5–5.2)
PROT SERPL-MCNC: 7.1 G/DL (ref 6–8.5)
RBC # BLD AUTO: 4.77 10*6/MM3 (ref 3.77–5.28)
SODIUM SERPL-SCNC: 142 MMOL/L (ref 136–145)
T4 FREE SERPL-MCNC: 1.19 NG/DL (ref 0.93–1.7)
TRIGL SERPL-MCNC: 112 MG/DL (ref 0–150)
TSH SERPL DL<=0.005 MIU/L-ACNC: 5.31 UIU/ML (ref 0.27–4.2)
VLDLC SERPL CALC-MCNC: 22.4 MG/DL
WBC # BLD AUTO: 7.12 10*3/MM3 (ref 3.4–10.8)

## 2019-11-01 ENCOUNTER — OFFICE VISIT (OUTPATIENT)
Dept: FAMILY MEDICINE CLINIC | Facility: CLINIC | Age: 64
End: 2019-11-01

## 2019-11-01 VITALS
OXYGEN SATURATION: 98 % | BODY MASS INDEX: 40.02 KG/M2 | SYSTOLIC BLOOD PRESSURE: 132 MMHG | TEMPERATURE: 98.1 F | HEIGHT: 64 IN | RESPIRATION RATE: 16 BRPM | DIASTOLIC BLOOD PRESSURE: 80 MMHG | WEIGHT: 234.4 LBS | HEART RATE: 109 BPM

## 2019-11-01 DIAGNOSIS — I10 ESSENTIAL HYPERTENSION: Primary | ICD-10-CM

## 2019-11-01 DIAGNOSIS — E78.5 HYPERLIPIDEMIA, UNSPECIFIED HYPERLIPIDEMIA TYPE: ICD-10-CM

## 2019-11-01 DIAGNOSIS — E55.9 VITAMIN D DEFICIENCY: ICD-10-CM

## 2019-11-01 DIAGNOSIS — E66.09 CLASS 2 OBESITY DUE TO EXCESS CALORIES WITHOUT SERIOUS COMORBIDITY WITH BODY MASS INDEX (BMI) OF 38.0 TO 38.9 IN ADULT: ICD-10-CM

## 2019-11-01 DIAGNOSIS — E11.9 TYPE 2 DIABETES MELLITUS WITHOUT COMPLICATION, WITHOUT LONG-TERM CURRENT USE OF INSULIN (HCC): ICD-10-CM

## 2019-11-01 PROCEDURE — 99214 OFFICE O/P EST MOD 30 MIN: CPT | Performed by: INTERNAL MEDICINE

## 2019-11-01 PROCEDURE — 90670 PCV13 VACCINE IM: CPT | Performed by: INTERNAL MEDICINE

## 2019-11-01 PROCEDURE — 90471 IMMUNIZATION ADMIN: CPT | Performed by: INTERNAL MEDICINE

## 2019-11-01 NOTE — PROGRESS NOTES
Subjective   Nehal Diaz is a 63 y.o. female. Patient is here today for follow-up on her hypertension, hyperlipidemia, vitamin D deficiency, obesity and diabetes.  She is generally been stable.  She does see Dr. Watkins for rheumatoid arthritis and is doing fine on that.  She denies any acute complaints  Chief Complaint   Patient presents with   • Diabetes     HLD, HTN- FOLLOW UP LABS          Vitals:    11/01/19 1330   BP: 132/80   Pulse: 109   Resp: 16   Temp: 98.1 °F (36.7 °C)   SpO2: 98%     Body mass index is 40.21 kg/m².  The following portions of the patient's history were reviewed and updated as appropriate: allergies, current medications, past family history, past medical history, past social history, past surgical history and problem list.    Past Medical History:   Diagnosis Date   • Allergic     seasonal   • Arthritis    • Cancer (CMS/HCC)     skin, followed by Dermatology   • Cataract    • Cerumen impaction    • Cholelithiasis    • Depression     followed by Psychiatry Nisha CRAWFORD   • GERD (gastroesophageal reflux disease)    • Hyperlipidemia    • Hypertension    • Hypothyroidism    • Kidney stone    • Obesity    • Osteoporosis    • Rheumatoid arthritis (CMS/HCC)     followed by Rheumatology, Dr. Watkins   • Seborrheic dermatitis of scalp    • Type 2 diabetes mellitus (CMS/HCC)    • Visual impairment    • Vitamin D deficiency       Allergies   Allergen Reactions   • Codeine Nausea And Vomiting      Social History     Socioeconomic History   • Marital status: Unknown     Spouse name: Not on file   • Number of children: Not on file   • Years of education: Not on file   • Highest education level: Not on file   Tobacco Use   • Smoking status: Never Smoker   • Smokeless tobacco: Never Used   Substance and Sexual Activity   • Alcohol use: Yes     Comment: socially, rarely   • Drug use: No   • Sexual activity: No   Social History Narrative    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  Lives at home with her .          Current Outpatient Medications:   •  abatacept (ORENCIA) 250 MG injection, Infuse  into a venous catheter., Disp: , Rfl:   •  ACCU-CHEK SOFTCLIX LANCETS lancets, TEST SUGAR DAILY, Disp: 100 each, Rfl: 1  •  atorvastatin (LIPITOR) 20 MG tablet, Take 1 tablet by mouth Daily., Disp: 90 tablet, Rfl: 3  •  Blood Glucose Monitoring Suppl (ACCU-CHEK CHARISSA PLUS) w/Device kit, TEST DAILY, Disp: 1 kit, Rfl: 0  •  Cholecalciferol (VITAMIN D) 1000 units tablet, Take 1 tablet by mouth Daily., Disp: 30 tablet, Rfl: 5  •  diclofenac (VOLTAREN) 1 % gel gel, VICKEY 4 GRAMS TO AFFECTED JOINTS QID PRN, Disp: , Rfl: 3  •  glucose blood (ACCU-CHEK CHARISSA PLUS) test strip, TEST SUGAR DAILY, Disp: 100 each, Rfl: 3  •  glucose monitor monitoring kit, 1 each Daily., Disp: 1 each, Rfl: 0  •  leflunomide (ARAVA) 20 MG tablet, Take 20 mg by mouth Daily., Disp: , Rfl:   •  levothyroxine (SYNTHROID, LEVOTHROID) 150 MCG tablet, Take 1 tablet by mouth Daily., Disp: 90 tablet, Rfl: 3  •  metFORMIN (GLUCOPHAGE) 500 MG tablet, Take 1 tablet by mouth Daily With Breakfast., Disp: 90 tablet, Rfl: 3  •  naproxen (NAPROSYN) 500 MG tablet, Take 500 mg by mouth 2 (Two) Times a Day With Meals., Disp: , Rfl:   •  ramipril (ALTACE) 10 MG capsule, Take 1 capsule by mouth Daily., Disp: 90 capsule, Rfl: 3  •  timolol (TIMOPTIC) 0.5 % ophthalmic solution, INSTILL 1 DROP IN BOTH EYES QD., Disp: , Rfl: 11     Objective     History of Present Illness     Review of Systems   Constitutional: Negative.    HENT: Negative.    Eyes: Negative.    Respiratory: Negative.    Cardiovascular: Negative.    Gastrointestinal: Negative.    Genitourinary: Negative.    Musculoskeletal: Negative.    Skin: Negative.    Neurological: Negative.    Psychiatric/Behavioral: Negative.        Physical Exam   Constitutional: She is oriented to person, place, and time. She appears well-developed and well-nourished.   Pleasant, cooperative no  acute distress, blood pressure 130/80   HENT:   Head: Normocephalic and atraumatic.   Eyes: Conjunctivae are normal. Pupils are equal, round, and reactive to light. No scleral icterus.   Neck: Normal range of motion. Neck supple.   Cardiovascular: Normal rate, regular rhythm and normal heart sounds.   Pulmonary/Chest: Effort normal and breath sounds normal. No respiratory distress. She has no wheezes. She has no rales.   Musculoskeletal: Normal range of motion. She exhibits no edema.   Neurological: She is alert and oriented to person, place, and time.   Skin: Skin is warm and dry.   Psychiatric: She has a normal mood and affect. Her behavior is normal.   Nursing note and vitals reviewed.      ASSESSMENT CBC was completely normal.  Vitamin D level was low at 21.8.  TSH was minimally elevated at 5.31 but free T4 was normal and clinically the patient appears euthyroid.  Urine microalbumin was low at 5, hemoglobin A1c is a bit higher but acceptable at 6.7.  CMP had a stable sugar of 113 was otherwise completely normal and lipid panel is well controlled with total cholesterol 137, HDL 44 LDL 71  #1-hypertension, adequate control on medication  #2-hyperlipidemia, excellent control on medication  #3-hypothyroidism, euthyroid on replacement  #4-diabetes mellitus type 2 with elevated but stable sugar and acceptable hemoglobin A1c  #5-vitamin D deficiency     Problem List Items Addressed This Visit     None          PLAN the patient received a Prevnar 13 vaccination today.  She will continue current medicines as now on try and watch dietary carbs and lose some weight.  Like to recheck her in 6 months with a CMP, lipid panel, hemoglobin A1c and SH and free T4 and vitamin D level    There are no Patient Instructions on file for this visit.  Return in about 6 months (around 5/1/2020) for with labs.

## 2020-04-13 DIAGNOSIS — E78.5 HYPERLIPIDEMIA, UNSPECIFIED HYPERLIPIDEMIA TYPE: ICD-10-CM

## 2020-04-13 RX ORDER — ATORVASTATIN CALCIUM 20 MG/1
20 TABLET, FILM COATED ORAL DAILY
Qty: 90 TABLET | Refills: 3 | Status: SHIPPED | OUTPATIENT
Start: 2020-04-13 | End: 2020-07-14 | Stop reason: SDUPTHER

## 2020-06-29 DIAGNOSIS — E11.9 TYPE 2 DIABETES MELLITUS WITHOUT COMPLICATION, WITHOUT LONG-TERM CURRENT USE OF INSULIN (HCC): ICD-10-CM

## 2020-06-29 DIAGNOSIS — E03.9 ACQUIRED HYPOTHYROIDISM: ICD-10-CM

## 2020-06-29 DIAGNOSIS — E78.5 HYPERLIPIDEMIA, UNSPECIFIED HYPERLIPIDEMIA TYPE: Primary | ICD-10-CM

## 2020-06-29 DIAGNOSIS — E55.9 VITAMIN D DEFICIENCY: ICD-10-CM

## 2020-06-29 DIAGNOSIS — I10 ESSENTIAL HYPERTENSION: ICD-10-CM

## 2020-07-08 LAB
25(OH)D3+25(OH)D2 SERPL-MCNC: 19.7 NG/ML (ref 30–100)
ALBUMIN SERPL-MCNC: 4.3 G/DL (ref 3.5–5.2)
ALBUMIN/GLOB SERPL: 1.5 G/DL
ALP SERPL-CCNC: 97 U/L (ref 39–117)
ALT SERPL-CCNC: 20 U/L (ref 1–33)
AST SERPL-CCNC: 15 U/L (ref 1–32)
BILIRUB SERPL-MCNC: 0.8 MG/DL (ref 0–1.2)
BUN SERPL-MCNC: 14 MG/DL (ref 8–23)
BUN/CREAT SERPL: 17.5 (ref 7–25)
CALCIUM SERPL-MCNC: 10.1 MG/DL (ref 8.6–10.5)
CHLORIDE SERPL-SCNC: 101 MMOL/L (ref 98–107)
CHOLEST SERPL-MCNC: 142 MG/DL (ref 0–200)
CO2 SERPL-SCNC: 28.5 MMOL/L (ref 22–29)
CREAT SERPL-MCNC: 0.8 MG/DL (ref 0.57–1)
GLOBULIN SER CALC-MCNC: 2.9 GM/DL
GLUCOSE SERPL-MCNC: 141 MG/DL (ref 65–99)
HBA1C MFR BLD: 6.7 % (ref 4.8–5.6)
HDLC SERPL-MCNC: 44 MG/DL (ref 40–60)
LDLC SERPL CALC-MCNC: 68 MG/DL (ref 0–100)
LDLC/HDLC SERPL: 1.55 {RATIO}
POTASSIUM SERPL-SCNC: 4.5 MMOL/L (ref 3.5–5.2)
PROT SERPL-MCNC: 7.2 G/DL (ref 6–8.5)
SODIUM SERPL-SCNC: 140 MMOL/L (ref 136–145)
T4 FREE SERPL-MCNC: 1.7 NG/DL (ref 0.93–1.7)
TRIGL SERPL-MCNC: 149 MG/DL (ref 0–150)
TSH SERPL DL<=0.005 MIU/L-ACNC: 3.29 UIU/ML (ref 0.27–4.2)
VLDLC SERPL CALC-MCNC: 29.8 MG/DL

## 2020-07-10 ENCOUNTER — OFFICE VISIT (OUTPATIENT)
Dept: FAMILY MEDICINE CLINIC | Facility: CLINIC | Age: 65
End: 2020-07-10

## 2020-07-10 ENCOUNTER — RESULTS ENCOUNTER (OUTPATIENT)
Dept: FAMILY MEDICINE CLINIC | Facility: CLINIC | Age: 65
End: 2020-07-10

## 2020-07-10 VITALS
OXYGEN SATURATION: 97 % | TEMPERATURE: 98.4 F | DIASTOLIC BLOOD PRESSURE: 97 MMHG | HEART RATE: 107 BPM | RESPIRATION RATE: 16 BRPM | BODY MASS INDEX: 39.27 KG/M2 | SYSTOLIC BLOOD PRESSURE: 144 MMHG | WEIGHT: 230 LBS | HEIGHT: 64 IN

## 2020-07-10 DIAGNOSIS — Z12.11 ENCOUNTER FOR SCREENING FOR MALIGNANT NEOPLASM OF COLON: ICD-10-CM

## 2020-07-10 DIAGNOSIS — F32.A DEPRESSION, UNSPECIFIED DEPRESSION TYPE: ICD-10-CM

## 2020-07-10 DIAGNOSIS — E55.9 VITAMIN D DEFICIENCY: ICD-10-CM

## 2020-07-10 DIAGNOSIS — E78.5 HYPERLIPIDEMIA, UNSPECIFIED HYPERLIPIDEMIA TYPE: Primary | ICD-10-CM

## 2020-07-10 DIAGNOSIS — I10 ESSENTIAL HYPERTENSION: ICD-10-CM

## 2020-07-10 DIAGNOSIS — E03.9 ACQUIRED HYPOTHYROIDISM: ICD-10-CM

## 2020-07-10 DIAGNOSIS — E03.9 HYPOTHYROIDISM, UNSPECIFIED TYPE: ICD-10-CM

## 2020-07-10 DIAGNOSIS — E11.9 TYPE 2 DIABETES MELLITUS WITHOUT COMPLICATION, WITHOUT LONG-TERM CURRENT USE OF INSULIN (HCC): ICD-10-CM

## 2020-07-10 DIAGNOSIS — E66.09 CLASS 2 OBESITY DUE TO EXCESS CALORIES WITHOUT SERIOUS COMORBIDITY WITH BODY MASS INDEX (BMI) OF 38.0 TO 38.9 IN ADULT: ICD-10-CM

## 2020-07-10 DIAGNOSIS — K21.9 GASTROESOPHAGEAL REFLUX DISEASE, ESOPHAGITIS PRESENCE NOT SPECIFIED: ICD-10-CM

## 2020-07-10 PROCEDURE — 99214 OFFICE O/P EST MOD 30 MIN: CPT | Performed by: INTERNAL MEDICINE

## 2020-07-10 RX ORDER — LEVOTHYROXINE SODIUM 0.15 MG/1
150 TABLET ORAL DAILY
Qty: 90 TABLET | Refills: 3 | Status: SHIPPED | OUTPATIENT
Start: 2020-07-10 | End: 2021-05-04 | Stop reason: SDUPTHER

## 2020-07-10 RX ORDER — RAMIPRIL 10 MG/1
10 CAPSULE ORAL DAILY
Qty: 90 CAPSULE | Refills: 3 | Status: SHIPPED | OUTPATIENT
Start: 2020-07-10 | End: 2021-05-04 | Stop reason: SDUPTHER

## 2020-07-10 NOTE — PROGRESS NOTES
Subjective   Nehal Diaz is a 64 y.o. female. Patient is here today for follow-up on her hypertension, hyperlipidemia, GERD, obesity, diabetes mellitus type 2, hypothyroidism and vitamin D deficiency.  She generally feels okay and has no acute complaints.  She does get infusions for rheumatoid arthritis.  She has had no chest pain, shortness of breath, edema or myalgias  Chief Complaint   Patient presents with   • Follow-up          Vitals:    07/10/20 1310   BP: 144/97   Pulse: 107   Resp: 16   Temp: 98.4 °F (36.9 °C)   SpO2: 97%     Body mass index is 39.45 kg/m².  The following portions of the patient's history were reviewed and updated as appropriate: allergies, current medications, past family history, past medical history, past social history, past surgical history and problem list.    Past Medical History:   Diagnosis Date   • Allergic     seasonal   • Arthritis    • Cancer (CMS/HCC)     skin, followed by Dermatology   • Cataract    • Cerumen impaction    • Cholelithiasis    • Depression     followed by Psychiatry Nisha CRAWFORD   • GERD (gastroesophageal reflux disease)    • Hyperlipidemia    • Hypertension    • Hypothyroidism    • Kidney stone    • Obesity    • Osteoporosis    • Rheumatoid arthritis (CMS/HCC)     followed by Rheumatology, Dr. Watkins   • Seborrheic dermatitis of scalp    • Type 2 diabetes mellitus (CMS/HCC)    • Visual impairment    • Vitamin D deficiency       Allergies   Allergen Reactions   • Codeine Nausea And Vomiting      Social History     Socioeconomic History   • Marital status: Unknown     Spouse name: Not on file   • Number of children: Not on file   • Years of education: Not on file   • Highest education level: Not on file   Tobacco Use   • Smoking status: Never Smoker   • Smokeless tobacco: Never Used   Substance and Sexual Activity   • Alcohol use: Yes     Comment: socially, rarely   • Drug use: No   • Sexual activity: Never   Social History Narrative    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  Lives at home with her .          Current Outpatient Medications:   •  abatacept (ORENCIA) 250 MG injection, Infuse  into a venous catheter., Disp: , Rfl:   •  ACCU-CHEK SOFTCLIX LANCETS lancets, TEST SUGAR DAILY, Disp: 100 each, Rfl: 1  •  atorvastatin (LIPITOR) 20 MG tablet, TAKE 1 TABLET BY MOUTH DAILY, Disp: 90 tablet, Rfl: 3  •  Blood Glucose Monitoring Suppl (ACCU-CHEK CHARISSA PLUS) w/Device kit, TEST DAILY, Disp: 1 kit, Rfl: 0  •  Cholecalciferol (VITAMIN D) 1000 units tablet, Take 1 tablet by mouth Daily., Disp: 30 tablet, Rfl: 5  •  diclofenac (VOLTAREN) 1 % gel gel, VICKEY 4 GRAMS TO AFFECTED JOINTS QID PRN, Disp: , Rfl: 3  •  glucose blood (ACCU-CHEK CHARISSA PLUS) test strip, TEST SUGAR DAILY, Disp: 100 each, Rfl: 3  •  glucose monitor monitoring kit, 1 each Daily., Disp: 1 each, Rfl: 0  •  leflunomide (ARAVA) 20 MG tablet, Take 20 mg by mouth Daily., Disp: , Rfl:   •  levothyroxine (SYNTHROID, LEVOTHROID) 150 MCG tablet, Take 1 tablet by mouth Daily., Disp: 90 tablet, Rfl: 3  •  metFORMIN (GLUCOPHAGE) 500 MG tablet, Take 1 tablet by mouth Daily With Breakfast., Disp: 90 tablet, Rfl: 3  •  naproxen (NAPROSYN) 500 MG tablet, Take 500 mg by mouth 2 (Two) Times a Day With Meals., Disp: , Rfl:   •  ramipril (ALTACE) 10 MG capsule, Take 1 capsule by mouth Daily., Disp: 90 capsule, Rfl: 3  •  timolol (TIMOPTIC) 0.5 % ophthalmic solution, INSTILL 1 DROP IN BOTH EYES QD., Disp: , Rfl: 11     Objective     History of Present Illness     Review of Systems   Constitutional: Negative.    HENT: Negative.    Respiratory: Negative.    Cardiovascular: Negative.    Gastrointestinal: Negative.    Genitourinary: Negative.    Musculoskeletal: Negative.    Skin: Negative.    Neurological: Negative.    Psychiatric/Behavioral: Negative.        Physical Exam   Constitutional: She is oriented to person, place, and time. She appears well-developed and well-nourished.    Pleasant, cooperative no acute distress blood pressure 140/80 but the patient very anxious   HENT:   Head: Normocephalic and atraumatic.   Eyes: Conjunctivae are normal. No scleral icterus.   Neck: Normal range of motion. Neck supple.   Cardiovascular: Normal rate, regular rhythm and normal heart sounds.   Pulmonary/Chest: Effort normal and breath sounds normal. No respiratory distress. She has no wheezes. She has no rales.   Musculoskeletal: Normal range of motion. She exhibits no edema.   Neurological: She is alert and oriented to person, place, and time.   Skin: Skin is warm and dry.   Psychiatric: She has a normal mood and affect. Her behavior is normal.   Nursing note and vitals reviewed.      ASSESSMENT CMP had a sugar of 141 and was otherwise normal and hemoglobin A1c is stable at 6.7.  TSH and free T4 were both normal on supplement.  Vitamin D level is low at 19.7.  Lipid panel was good with total cholesterol 142, HDL 44 and LDL 68.  #1-hypertension, controlled based on home readings, borderline here today  #2-hyperlipidemia, good control on medication  #3-hypothyroidism, euthyroid on replacement  #4-vitamin D deficiency  #5-diabetes mellitus type 2 with adequate control on metformin  #6-history of rheumatoid arthritis, seeing rheumatology     Problem List Items Addressed This Visit        Cardiovascular and Mediastinum    Hypertension    Relevant Medications    ramipril (ALTACE) 10 MG capsule    Hyperlipidemia - Primary       Digestive    GERD (gastroesophageal reflux disease)    Obesity       Endocrine    Hypothyroidism    Relevant Medications    levothyroxine (SYNTHROID, LEVOTHROID) 150 MCG tablet    Type 2 diabetes mellitus (CMS/HCC)    Relevant Medications    metFORMIN (GLUCOPHAGE) 500 MG tablet       Other    Depression      Other Visit Diagnoses     Encounter for screening for malignant neoplasm of colon        Relevant Orders    Cologuard - Stool, Per Rectum          PLAN I ordered a Cologuard test  on the patient and she will continue current medicines as now.  I recommended the flu shot and the shingles immunizations.  I want the patient to start on vitamin D 1000 units daily.  I would like to recheck her in 6 months with a CBC, CMP, lipid panel, hemoglobin A1c, TSH and free T4, vitamin D level, urine microalbumin    There are no Patient Instructions on file for this visit.  Return in about 6 months (around 1/10/2021) for with labs.

## 2020-07-14 DIAGNOSIS — E78.5 HYPERLIPIDEMIA, UNSPECIFIED HYPERLIPIDEMIA TYPE: ICD-10-CM

## 2020-07-14 NOTE — TELEPHONE ENCOUNTER
PATIENT IS CALLING IN REGARDS TO NEEDING A NEW GLUCOSE MONITOR AND TESTING STRIPS AND THE LANCETS    ACCU-CHEK SOFTCLIX LANCETS lancets  glucose blood (ACCU-CHEK CHARISSA PLUS) test strip    glucose monitor monitoring kit OR    Blood Glucose Monitoring Suppl (ACCU-CHEK CHARISSA PLUS) w/Device kit    PATIENT WAS SEEN ON 7/10 AND HAD 3 PRESCRIPTIONS CALLED IN, PATIENT IS WANTING TO KNOW IF SHE SHOULD STILL BE TAKING HER atorvastatin (LIPITOR) 20 MG tablet    PHARMACY   [address] [facility] AT [address]  - [phone]  - [phone] FX     PLEASE CALL PATIENT AND ADVISE   [phone]

## 2020-07-20 RX ORDER — ATORVASTATIN CALCIUM 20 MG/1
20 TABLET, FILM COATED ORAL DAILY
Qty: 90 TABLET | Refills: 3 | Status: SHIPPED | OUTPATIENT
Start: 2020-07-20 | End: 2021-05-04 | Stop reason: SDUPTHER

## 2020-07-20 NOTE — TELEPHONE ENCOUNTER
PATIENT CALLED STATING THAT WHEN SHE WAS IN OFFICE ON 7/10/20, SHE REQUESTED FOR DR. SHAIKH TO TRANSFER ALL OF HER PRESCRIPTIONS FROM THE PHARMACY AT [facility] TO THE PHARMACY AT [facility] ON [facility].    HOWEVER, THE PATIENT STATED THAT 3 OF HER PRESCRIPTIONS (THE ATORVASTATIN (LIPITOR) 20 MG TABLET, THE GLUCOSE BLOOD (ACCU-CHEK CHARISSA PLUS) TEST STRIP, AND THE ACCU-CHEK SOFTCLIX LANCETS) WERE NEVER RECEIVED BY THE PHARMACY AT [facility], SO SHE IS REQUESTING FOR THEM TO BE SENT THERE AS SOON AS POSSIBLE.    THE PATIENT STATED THAT SHE TAKES 1 TABLET A DAY OF THE ATORVASTATIN (LIPITOR) 20 MG TABLET, AND SHE CURRENTLY HAS 8 TABLETS LEFT OF THIS MEDICATION.    THE PATIENT STATED THAT SHE CHECKS HER BLOOD SUGAR ONCE A DAY, AND SHE IS CURRENTLY COMPLETELY OUT OF THE THE GLUCOSE BLOOD (ACCU-CHEK CHARISSA PLUS) TEST STRIP AND THE ACCU-CHEK SOFTCLIX LANCETS.    I CONFIRMED THE CORRECT PHARMACY WITH THE PATIENT TO BE [facility] (PHONE NUMBER: [phone]).    PLEASE CALL THE PATIENT -[phone] WHEN THIS MESSAGE HAS BEEN RECEIVED AND ADVISE HER REGARDING THE STATUS OF THESE PRESCRIPTION REQUESTS.

## 2021-01-04 ENCOUNTER — TELEPHONE (OUTPATIENT)
Dept: FAMILY MEDICINE CLINIC | Facility: CLINIC | Age: 66
End: 2021-01-04

## 2021-01-04 NOTE — TELEPHONE ENCOUNTER
PATIENT CALLED TO CHANGE APPTS, UNABLE TO WARM TRANSFER TO CHANGE LAB APPT    PLEASE CALL BACK TO RESCHEDULE FOR MARCH    CB#: [phone]

## 2021-03-16 ENCOUNTER — BULK ORDERING (OUTPATIENT)
Dept: CASE MANAGEMENT | Facility: OTHER | Age: 66
End: 2021-03-16

## 2021-03-16 DIAGNOSIS — Z23 IMMUNIZATION DUE: ICD-10-CM

## 2021-04-19 DIAGNOSIS — E55.9 VITAMIN D DEFICIENCY: ICD-10-CM

## 2021-04-19 DIAGNOSIS — E11.9 TYPE 2 DIABETES MELLITUS WITHOUT COMPLICATION, WITHOUT LONG-TERM CURRENT USE OF INSULIN (HCC): ICD-10-CM

## 2021-04-19 DIAGNOSIS — E78.5 HYPERLIPIDEMIA, UNSPECIFIED HYPERLIPIDEMIA TYPE: Primary | ICD-10-CM

## 2021-05-04 ENCOUNTER — OFFICE VISIT (OUTPATIENT)
Dept: FAMILY MEDICINE CLINIC | Facility: CLINIC | Age: 66
End: 2021-05-04

## 2021-05-04 VITALS
HEART RATE: 106 BPM | OXYGEN SATURATION: 99 % | HEIGHT: 64 IN | TEMPERATURE: 96.7 F | BODY MASS INDEX: 40.15 KG/M2 | DIASTOLIC BLOOD PRESSURE: 90 MMHG | RESPIRATION RATE: 18 BRPM | SYSTOLIC BLOOD PRESSURE: 140 MMHG | WEIGHT: 235.2 LBS

## 2021-05-04 DIAGNOSIS — J30.1 CHRONIC SEASONAL ALLERGIC RHINITIS DUE TO POLLEN: ICD-10-CM

## 2021-05-04 DIAGNOSIS — E66.09 CLASS 2 OBESITY DUE TO EXCESS CALORIES WITHOUT SERIOUS COMORBIDITY WITH BODY MASS INDEX (BMI) OF 38.0 TO 38.9 IN ADULT: ICD-10-CM

## 2021-05-04 DIAGNOSIS — E11.9 TYPE 2 DIABETES MELLITUS WITHOUT COMPLICATION, WITHOUT LONG-TERM CURRENT USE OF INSULIN (HCC): ICD-10-CM

## 2021-05-04 DIAGNOSIS — E78.5 HYPERLIPIDEMIA, UNSPECIFIED HYPERLIPIDEMIA TYPE: ICD-10-CM

## 2021-05-04 DIAGNOSIS — E55.9 VITAMIN D DEFICIENCY: ICD-10-CM

## 2021-05-04 DIAGNOSIS — E03.9 ACQUIRED HYPOTHYROIDISM: ICD-10-CM

## 2021-05-04 DIAGNOSIS — E03.9 HYPOTHYROIDISM, UNSPECIFIED TYPE: ICD-10-CM

## 2021-05-04 DIAGNOSIS — I10 ESSENTIAL HYPERTENSION: ICD-10-CM

## 2021-05-04 DIAGNOSIS — K21.9 GASTROESOPHAGEAL REFLUX DISEASE, UNSPECIFIED WHETHER ESOPHAGITIS PRESENT: Primary | ICD-10-CM

## 2021-05-04 PROCEDURE — 99214 OFFICE O/P EST MOD 30 MIN: CPT | Performed by: INTERNAL MEDICINE

## 2021-05-04 RX ORDER — ATORVASTATIN CALCIUM 20 MG/1
20 TABLET, FILM COATED ORAL DAILY
Qty: 90 TABLET | Refills: 3 | Status: SHIPPED | OUTPATIENT
Start: 2021-05-04 | End: 2022-03-15 | Stop reason: SDUPTHER

## 2021-05-04 RX ORDER — LEVOTHYROXINE SODIUM 0.15 MG/1
150 TABLET ORAL DAILY
Qty: 90 TABLET | Refills: 3 | Status: SHIPPED | OUTPATIENT
Start: 2021-05-04 | End: 2022-03-15 | Stop reason: SDUPTHER

## 2021-05-04 RX ORDER — RAMIPRIL 10 MG/1
10 CAPSULE ORAL DAILY
Qty: 90 CAPSULE | Refills: 3 | Status: SHIPPED | OUTPATIENT
Start: 2021-05-04 | End: 2022-03-15 | Stop reason: SDUPTHER

## 2021-05-04 NOTE — PROGRESS NOTES
Subjective   Nehal Diaz is a 65 y.o. female. Patient is here today for follow-up on her hypertension, hyperlipidemia, allergies, diabetes mellitus type 2, vitamin D deficiency, obesity and hypothyroidism.  She is generally been stable but has had increased stress and has gained weight during this COVID-19 pandemic.  She however denies any acute symptoms.  She is overdue for colonoscopy.  She has an upcoming gynecology visit next month and will get a mammogram then  Chief Complaint   Patient presents with   • Hypertension     HYPERLIPIDEMIA, HYPOTHYROIDISM, DIABETES- FOLLOW UP LABS           Vitals:    05/04/21 1337   BP: 140/90   Pulse: 106   Resp: 18   Temp: 96.7 °F (35.9 °C)   SpO2: 99%     Body mass index is 40.35 kg/m².  The following portions of the patient's history were reviewed and updated as appropriate: allergies, current medications, past family history, past medical history, past social history, past surgical history and problem list.    Past Medical History:   Diagnosis Date   • Allergic     seasonal   • Arthritis    • Cancer (CMS/HCC)     skin, followed by Dermatology   • Cataract    • Cerumen impaction    • Cholelithiasis    • Depression     followed by Psychiatry Nisha CRAWFORD   • GERD (gastroesophageal reflux disease)    • Hyperlipidemia    • Hypertension    • Hypothyroidism    • Kidney stone    • Obesity    • Osteoporosis    • Rheumatoid arthritis (CMS/HCC)     followed by Rheumatology, Dr. Watkins   • Seborrheic dermatitis of scalp    • Type 2 diabetes mellitus (CMS/HCC)    • Visual impairment    • Vitamin D deficiency       Allergies   Allergen Reactions   • Codeine Nausea And Vomiting      Social History     Socioeconomic History   • Marital status: Unknown     Spouse name: Not on file   • Number of children: Not on file   • Years of education: Not on file   • Highest education level: Not on file   Tobacco Use   • Smoking status: Never Smoker   • Smokeless tobacco: Never Used   Substance  and Sexual Activity   • Alcohol use: Yes     Comment: socially, rarely   • Drug use: No   • Sexual activity: Never        Current Outpatient Medications:   •  abatacept (ORENCIA) 250 MG injection, Infuse  into a venous catheter., Disp: , Rfl:   •  ACCU-CHEK SOFTCLIX LANCETS lancets, TEST SUGAR DAILY, Disp: 100 each, Rfl: 1  •  atorvastatin (LIPITOR) 20 MG tablet, Take 1 tablet by mouth Daily., Disp: 90 tablet, Rfl: 3  •  Blood Glucose Monitoring Suppl (ACCU-CHEK CHARISSA PLUS) w/Device kit, TEST DAILY, Disp: 1 kit, Rfl: 0  •  Cholecalciferol (VITAMIN D) 1000 units tablet, Take 1 tablet by mouth Daily., Disp: 30 tablet, Rfl: 5  •  diclofenac (VOLTAREN) 1 % gel gel, VICKEY 4 GRAMS TO AFFECTED JOINTS QID PRN, Disp: , Rfl: 3  •  glucose blood (ACCU-CHEK CHARISSA PLUS) test strip, TEST SUGAR DAILY, Disp: 100 each, Rfl: 3  •  glucose monitor monitoring kit, 1 each Daily., Disp: 1 each, Rfl: 0  •  leflunomide (ARAVA) 20 MG tablet, Take 20 mg by mouth Daily., Disp: , Rfl:   •  levothyroxine (SYNTHROID, LEVOTHROID) 150 MCG tablet, Take 1 tablet by mouth Daily., Disp: 90 tablet, Rfl: 3  •  metFORMIN (GLUCOPHAGE) 500 MG tablet, Take 1 tablet by mouth 2 (Two) Times a Day With Meals., Disp: 180 tablet, Rfl: 3  •  naproxen (NAPROSYN) 500 MG tablet, Take 500 mg by mouth 2 (Two) Times a Day With Meals., Disp: , Rfl:   •  ramipril (ALTACE) 10 MG capsule, Take 1 capsule by mouth Daily., Disp: 90 capsule, Rfl: 3  •  timolol (TIMOPTIC) 0.5 % ophthalmic solution, INSTILL 1 DROP IN BOTH EYES QD., Disp: , Rfl: 11     Objective     History of Present Illness     Review of Systems   Constitutional: Negative.    HENT: Negative.    Respiratory: Negative.    Cardiovascular: Negative.    Gastrointestinal: Negative.    Genitourinary: Negative.    Musculoskeletal: Negative.    Skin: Negative.    Allergic/Immunologic: Positive for environmental allergies.   Neurological: Negative.    Hematological: Negative.    Psychiatric/Behavioral: Negative.         Physical Exam  Vitals and nursing note reviewed.   Constitutional:       General: She is not in acute distress.     Appearance: Normal appearance. She is obese. She is not ill-appearing.   HENT:      Head: Normocephalic and atraumatic.   Eyes:      General: No scleral icterus.     Conjunctiva/sclera: Conjunctivae normal.   Cardiovascular:      Rate and Rhythm: Normal rate and regular rhythm.      Heart sounds: Normal heart sounds.   Pulmonary:      Effort: Pulmonary effort is normal. No respiratory distress.      Breath sounds: Normal breath sounds. No wheezing or rales.   Musculoskeletal:         General: Normal range of motion.      Cervical back: Normal range of motion and neck supple.   Skin:     General: Skin is warm and dry.   Neurological:      General: No focal deficit present.      Mental Status: She is alert and oriented to person, place, and time.   Psychiatric:         Mood and Affect: Mood normal.         Behavior: Behavior normal.         ASSESSMENT CBC was normal.  CMP has an elevated but stable sugar of 143 and is otherwise normal and hemoglobin A1c is a bit higher at 7.1.  Vitamin D level remains low at 19.5.  Lipid panel is fine with total cholesterol 160, HDL 40 and LDL 94.  TSH is elevated at 8.15 with a normal T4 of 1.12 and the patient has missed some thyroid doses.  Urine microalbumin is low at 10.7  #1-environmental and seasonal allergies, stable  #2-hyperlipidemia, stable and controlled on medication  #3-hypertension controlled on medication  #4-diabetes mellitus type 2, not optimally controlled  #5-vitamin D deficiency  #6-obesity with weight gain  #7-hypothyroidism, clinically euthyroid     Problems Addressed this Visit        Allergies and Adverse Reactions    Chronic seasonal allergic rhinitis due to pollen       Cardiac and Vasculature    Hypertension    Relevant Medications    ramipril (ALTACE) 10 MG capsule    Hyperlipidemia    Relevant Medications    atorvastatin (LIPITOR) 20  MG tablet       Endocrine and Metabolic    Hypothyroidism    Relevant Medications    levothyroxine (SYNTHROID, LEVOTHROID) 150 MCG tablet    Obesity    Vitamin D deficiency    Type 2 diabetes mellitus (CMS/[facility])    Relevant Medications    metFORMIN (GLUCOPHAGE) 500 MG tablet       Gastrointestinal Abdominal     GERD (gastroesophageal reflux disease) - Primary      Diagnoses       Codes Comments    Gastroesophageal reflux disease, unspecified whether esophagitis present    -  Primary ICD-10-CM: K21.9  ICD-9-CM: 530.81     Type 2 diabetes mellitus without complication, without long-term current use of insulin (CMS/[facility])     ICD-10-CM: E11.9  ICD-9-CM: 250.00     Hyperlipidemia, unspecified hyperlipidemia type     ICD-10-CM: E78.5  ICD-9-CM: 272.4     Essential hypertension     ICD-10-CM: I10  ICD-9-CM: 401.9     Hypothyroidism, unspecified type     ICD-10-CM: E03.9  ICD-9-CM: 244.9     Acquired hypothyroidism     ICD-10-CM: E03.9  ICD-9-CM: 244.9     Class 2 obesity due to excess calories without serious comorbidity with body mass index (BMI) of 38.0 to 38.9 in adult     ICD-10-CM: E66.09, Z68.38  ICD-9-CM: 278.00, V85.38     Vitamin D deficiency     ICD-10-CM: E55.9  ICD-9-CM: 268.9     Chronic seasonal allergic rhinitis due to pollen     ICD-10-CM: J30.1  ICD-9-CM: 477.0           PLAN the patient will contact her endoscopist and arrange for colonoscopy and get the results to us.  She has upcoming gynecology appointment.  She will continue current medicines as now but I am increasing the Metformin 500 mg to take twice a day.  I would like to recheck her in 6 months with a CMP, lipid panel, hemoglobin A1c, TSH and free T4 and vitamin D level    There are no Patient Instructions on file for this visit.  No follow-ups on file.

## 2021-05-06 ENCOUNTER — TELEPHONE (OUTPATIENT)
Dept: GASTROENTEROLOGY | Facility: CLINIC | Age: 66
End: 2021-05-06

## 2021-05-06 NOTE — TELEPHONE ENCOUNTER
----- Message from Marcello Mata sent at 5/6/2021 12:43 PM EDT -----  Regarding: Open Access  Contact: [phone]  Pt wants procedure

## 2021-05-07 ENCOUNTER — PREP FOR SURGERY (OUTPATIENT)
Dept: OTHER | Facility: HOSPITAL | Age: 66
End: 2021-05-07

## 2021-05-07 ENCOUNTER — TELEPHONE (OUTPATIENT)
Dept: GASTROENTEROLOGY | Facility: CLINIC | Age: 66
End: 2021-05-07

## 2021-05-07 DIAGNOSIS — Z80.0 FAMILY HISTORY OF GI MALIGNANCY: Primary | ICD-10-CM

## 2021-05-07 NOTE — TELEPHONE ENCOUNTER
Last scope over 6 years ago, patient not sure where-- personal hx of polyps-- sister hx of colon ca-- no ASA or blood thinners-- medications:    abatacept (ORENCIA) 250 MG injection  ACCU-CHEK SOFTCLIX LANCETS lancets  atorvastatin (LIPITOR) 20 MG tablet  Blood Glucose Monitoring Suppl (ACCU-CHEK CHARISSA PLUS) w/Device kit  Cholecalciferol (VITAMIN D) 1000 units tablet  diclofenac (VOLTAREN) 1 % gel gel  glucose blood (ACCU-CHEK CHARISSA PLUS) test strip  glucose monitor monitoring kit  leflunomide (ARAVA) 20 MG tablet  levothyroxine (SYNTHROID, LEVOTHROID) 150 MCG tablet  metFORMIN (GLUCOPHAGE) 500 MG tablet  naproxen (NAPROSYN) 500 MG tablet  ramipril (ALTACE) 10 MG capsule  timolol (TIMOPTIC) 0.5 % ophthalmic solution    OA form scanned into media

## 2021-05-08 DIAGNOSIS — K63.5 POLYP OF COLON, UNSPECIFIED PART OF COLON, UNSPECIFIED TYPE: Primary | ICD-10-CM

## 2021-05-08 DIAGNOSIS — Z80.0 FAMILY HISTORY OF GI MALIGNANCY: ICD-10-CM

## 2021-05-10 ENCOUNTER — TELEPHONE (OUTPATIENT)
Dept: GASTROENTEROLOGY | Facility: CLINIC | Age: 66
End: 2021-05-10

## 2021-05-10 PROBLEM — Z80.0 FAMILY HISTORY OF GI MALIGNANCY: Status: ACTIVE | Noted: 2021-05-10

## 2021-05-10 PROBLEM — K63.5 POLYP OF COLON: Status: ACTIVE | Noted: 2021-05-10

## 2022-01-05 DIAGNOSIS — E11.9 TYPE 2 DIABETES MELLITUS WITHOUT COMPLICATION, WITHOUT LONG-TERM CURRENT USE OF INSULIN: ICD-10-CM

## 2022-01-05 DIAGNOSIS — E03.9 ACQUIRED HYPOTHYROIDISM: ICD-10-CM

## 2022-01-05 DIAGNOSIS — E78.5 HYPERLIPIDEMIA, UNSPECIFIED HYPERLIPIDEMIA TYPE: ICD-10-CM

## 2022-01-05 DIAGNOSIS — E55.9 VITAMIN D DEFICIENCY: ICD-10-CM

## 2022-03-10 LAB
25(OH)D3+25(OH)D2 SERPL-MCNC: 16.1 NG/ML (ref 30–100)
ALBUMIN SERPL-MCNC: 4.2 G/DL (ref 3.8–4.8)
ALBUMIN/GLOB SERPL: 1.4 {RATIO} (ref 1.2–2.2)
ALP SERPL-CCNC: 111 IU/L (ref 44–121)
ALT SERPL-CCNC: 16 IU/L (ref 0–32)
AST SERPL-CCNC: 16 IU/L (ref 0–40)
BILIRUB SERPL-MCNC: 0.6 MG/DL (ref 0–1.2)
BUN SERPL-MCNC: 16 MG/DL (ref 8–27)
BUN/CREAT SERPL: 20 (ref 12–28)
CALCIUM SERPL-MCNC: 10.8 MG/DL (ref 8.7–10.3)
CHLORIDE SERPL-SCNC: 101 MMOL/L (ref 96–106)
CHOLEST SERPL-MCNC: 142 MG/DL (ref 100–199)
CO2 SERPL-SCNC: 25 MMOL/L (ref 20–29)
CREAT SERPL-MCNC: 0.82 MG/DL (ref 0.57–1)
EGFR GENE MUT ANL BLD/T: 78 ML/MIN/1.73
GLOBULIN SER CALC-MCNC: 3 G/DL (ref 1.5–4.5)
GLUCOSE SERPL-MCNC: 152 MG/DL (ref 65–99)
HBA1C MFR BLD: 7 % (ref 4.8–5.6)
HDLC SERPL-MCNC: 46 MG/DL
LDLC SERPL CALC-MCNC: 68 MG/DL (ref 0–99)
LDLC/HDLC SERPL: 1.5 RATIO (ref 0–3.2)
POTASSIUM SERPL-SCNC: 4.9 MMOL/L (ref 3.5–5.2)
PROT SERPL-MCNC: 7.2 G/DL (ref 6–8.5)
SODIUM SERPL-SCNC: 140 MMOL/L (ref 134–144)
T4 FREE SERPL-MCNC: 1.37 NG/DL (ref 0.82–1.77)
TRIGL SERPL-MCNC: 163 MG/DL (ref 0–149)
TSH SERPL DL<=0.005 MIU/L-ACNC: 3.76 UIU/ML (ref 0.45–4.5)
VLDLC SERPL CALC-MCNC: 28 MG/DL (ref 5–40)

## 2022-03-15 ENCOUNTER — OFFICE VISIT (OUTPATIENT)
Dept: FAMILY MEDICINE CLINIC | Facility: CLINIC | Age: 67
End: 2022-03-15

## 2022-03-15 VITALS
HEIGHT: 64 IN | HEART RATE: 91 BPM | BODY MASS INDEX: 38.35 KG/M2 | RESPIRATION RATE: 18 BRPM | OXYGEN SATURATION: 98 % | DIASTOLIC BLOOD PRESSURE: 80 MMHG | TEMPERATURE: 96.9 F | WEIGHT: 224.6 LBS | SYSTOLIC BLOOD PRESSURE: 120 MMHG

## 2022-03-15 DIAGNOSIS — E78.5 HYPERLIPIDEMIA, UNSPECIFIED HYPERLIPIDEMIA TYPE: ICD-10-CM

## 2022-03-15 DIAGNOSIS — J30.1 CHRONIC SEASONAL ALLERGIC RHINITIS DUE TO POLLEN: Primary | ICD-10-CM

## 2022-03-15 DIAGNOSIS — I10 ESSENTIAL HYPERTENSION: ICD-10-CM

## 2022-03-15 DIAGNOSIS — E03.9 ACQUIRED HYPOTHYROIDISM: ICD-10-CM

## 2022-03-15 DIAGNOSIS — E03.9 HYPOTHYROIDISM, UNSPECIFIED TYPE: ICD-10-CM

## 2022-03-15 DIAGNOSIS — E66.09 CLASS 2 OBESITY DUE TO EXCESS CALORIES WITHOUT SERIOUS COMORBIDITY WITH BODY MASS INDEX (BMI) OF 38.0 TO 38.9 IN ADULT: ICD-10-CM

## 2022-03-15 DIAGNOSIS — E11.9 TYPE 2 DIABETES MELLITUS WITHOUT COMPLICATION, WITHOUT LONG-TERM CURRENT USE OF INSULIN: ICD-10-CM

## 2022-03-15 DIAGNOSIS — I10 PRIMARY HYPERTENSION: ICD-10-CM

## 2022-03-15 DIAGNOSIS — M81.0 AGE-RELATED OSTEOPOROSIS WITHOUT CURRENT PATHOLOGICAL FRACTURE: ICD-10-CM

## 2022-03-15 DIAGNOSIS — K21.9 GASTROESOPHAGEAL REFLUX DISEASE, UNSPECIFIED WHETHER ESOPHAGITIS PRESENT: ICD-10-CM

## 2022-03-15 DIAGNOSIS — E55.9 VITAMIN D DEFICIENCY: ICD-10-CM

## 2022-03-15 PROCEDURE — 96160 PT-FOCUSED HLTH RISK ASSMT: CPT | Performed by: INTERNAL MEDICINE

## 2022-03-15 PROCEDURE — 1159F MED LIST DOCD IN RCRD: CPT | Performed by: INTERNAL MEDICINE

## 2022-03-15 PROCEDURE — G0438 PPPS, INITIAL VISIT: HCPCS | Performed by: INTERNAL MEDICINE

## 2022-03-15 PROCEDURE — 1170F FXNL STATUS ASSESSED: CPT | Performed by: INTERNAL MEDICINE

## 2022-03-15 PROCEDURE — 99214 OFFICE O/P EST MOD 30 MIN: CPT | Performed by: INTERNAL MEDICINE

## 2022-03-15 RX ORDER — MELATONIN
2000 DAILY
Qty: 30 TABLET | Refills: 5 | COMMUNITY
Start: 2022-03-15

## 2022-03-15 RX ORDER — ATORVASTATIN CALCIUM 20 MG/1
20 TABLET, FILM COATED ORAL DAILY
Qty: 90 TABLET | Refills: 3 | Status: SHIPPED | OUTPATIENT
Start: 2022-03-15

## 2022-03-15 RX ORDER — LEVOTHYROXINE SODIUM 0.15 MG/1
150 TABLET ORAL DAILY
Qty: 90 TABLET | Refills: 3 | Status: SHIPPED | OUTPATIENT
Start: 2022-03-15

## 2022-03-15 RX ORDER — RAMIPRIL 10 MG/1
10 CAPSULE ORAL DAILY
Qty: 90 CAPSULE | Refills: 3 | Status: SHIPPED | OUTPATIENT
Start: 2022-03-15

## 2022-03-15 NOTE — PROGRESS NOTES
The ABCs of the Annual Wellness Visit  Initial Medicare Wellness Visit    Chief Complaint   Patient presents with   • Medicare Wellness-Initial Visit     PT HERE FOR AWV AND F/U LABS     Subjective   History of Present Illness:  Nehal Diaz is a 66 y.o. female who presents for an Initial Medicare Wellness Visit.  She is also here for follow-up on her hypertension, hyperlipidemia, hypothyroidism, diabetes mellitus type 2, obesity and vitamin D deficiency.  She is generally been stable and has no acute complaints    The following portions of the patient's history were reviewed and   updated as appropriate: allergies, current medications, past family history, past medical history, past social history, past surgical history and problem list.     Compared to one year ago, the patient feels her physical   health is the same.    Compared to one year ago, the patient feels her mental   health is the same.    Recent Hospitalizations:  She was not admitted to the hospital during the last year.       Current Medical Providers:  Patient Care Team:  Juanjo Jesus MD as PCP - General (Internal Medicine)  Marko Watkins MD as Consulting Physician (Rheumatology)    Outpatient Medications Prior to Visit   Medication Sig Dispense Refill   • abatacept (ORENCIA) 250 MG injection Infuse  into a venous catheter.     • ACCU-CHEK SOFTCLIX LANCETS lancets TEST SUGAR DAILY 100 each 1   • atorvastatin (LIPITOR) 20 MG tablet Take 1 tablet by mouth Daily. 90 tablet 3   • Blood Glucose Monitoring Suppl (ACCU-CHEK CHARISSA PLUS) w/Device kit TEST DAILY 1 kit 0   • Cholecalciferol (VITAMIN D) 1000 units tablet Take 1 tablet by mouth Daily. 30 tablet 5   • diclofenac (VOLTAREN) 1 % gel gel VICKEY 4 GRAMS TO AFFECTED JOINTS QID PRN  3   • glucose blood (ACCU-CHEK CHARISSA PLUS) test strip TEST SUGAR DAILY 100 each 3   • glucose monitor monitoring kit 1 each Daily. 1 each 0   • leflunomide (ARAVA) 20 MG tablet Take 20 mg by mouth Daily.     •  "levothyroxine (SYNTHROID, LEVOTHROID) 150 MCG tablet Take 1 tablet by mouth Daily. 90 tablet 3   • metFORMIN (GLUCOPHAGE) 500 MG tablet Take 1 tablet by mouth 2 (Two) Times a Day With Meals. 180 tablet 3   • ramipril (ALTACE) 10 MG capsule Take 1 capsule by mouth Daily. 90 capsule 3   • timolol (TIMOPTIC) 0.5 % ophthalmic solution INSTILL 1 DROP IN BOTH EYES QD.  11   • naproxen (NAPROSYN) 500 MG tablet Take 500 mg by mouth 2 (Two) Times a Day With Meals.       No facility-administered medications prior to visit.       No opioid medication identified on active medication list. I have reviewed chart for other potential  high risk medication/s and harmful drug interactions in the elderly.          Aspirin is not on active medication list.  Aspirin use is not indicated based on review of current medical condition/s. Risk of harm outweighs potential benefits.  .    Patient Active Problem List   Diagnosis   • History of skin cancer   • Rheumatoid arthritis (HCC)   • Seborrheic dermatitis of scalp   • GERD (gastroesophageal reflux disease)   • Arthritis   • Hypertension   • Kidney stone   • Hypothyroidism   • Obesity   • Osteoporosis   • Visual impairment   • Chronic seasonal allergic rhinitis due to pollen   • Hyperlipidemia   • Depression   • Vitamin D deficiency   • Type 2 diabetes mellitus (HCC)   • Polyp of colon   • Family history of GI malignancy     Advance Care Planning  Advance Directive is not on file.  ACP discussion was held with the patient during this visit. Patient has an advance directive (not in EMR), copy requested.    Review of Systems   All other systems reviewed and are negative.       Objective       Vitals:    03/15/22 1320   BP: 130/80   Pulse: 91   Resp: 18   Temp: 96.9 °F (36.1 °C)   TempSrc: Oral   SpO2: 98%   Weight: 102 kg (224 lb 9.6 oz)   Height: 162.6 cm (64.02\")     BMI Readings from Last 1 Encounters:   03/15/22 38.53 kg/m²   BMI is above normal parameters. Recommendations include: " exercise counseling    Does the patient have evidence of cognitive impairment? No    Physical Exam  Vitals (120/80) and nursing note reviewed.   Constitutional:       General: She is not in acute distress.     Appearance: Normal appearance. She is obese. She is not ill-appearing.   HENT:      Head: Normocephalic and atraumatic.   Cardiovascular:      Rate and Rhythm: Normal rate and regular rhythm.      Heart sounds: Normal heart sounds.   Pulmonary:      Effort: Pulmonary effort is normal. No respiratory distress.      Breath sounds: Normal breath sounds. No wheezing or rales.   Musculoskeletal:         General: Normal range of motion.   Skin:     General: Skin is warm and dry.   Neurological:      General: No focal deficit present.      Mental Status: She is alert and oriented to person, place, and time.   Psychiatric:         Mood and Affect: Mood normal.         Behavior: Behavior normal.       Lab Results   Component Value Date    CHLPL 142 03/09/2022    TRIG 163 (H) 03/09/2022    HDL 46 03/09/2022    LDL 68 03/09/2022    VLDL 28 03/09/2022    HGBA1C 7.0 (H) 03/09/2022          HEALTH RISK ASSESSMENT    Smoking Status:  Social History     Tobacco Use   Smoking Status Never Smoker   Smokeless Tobacco Never Used     Alcohol Consumption:  Social History     Substance and Sexual Activity   Alcohol Use Not Currently    Comment: socially, rarely     Fall Risk Screen:    STEADI Fall Risk Assessment was completed, and patient is at LOW risk for falls.Assessment completed on:3/15/2022    Depression Screen:   PHQ-2/PHQ-9 Depression Screening 3/15/2022   Retired PHQ-9 Total Score -   Retired Total Score -   Little Interest or Pleasure in Doing Things 0-->not at all   Feeling Down, Depressed or Hopeless 0-->not at all   PHQ-9: Brief Depression Severity Measure Score 0       Health Habits and Functional and Cognitive Screening:  Functional & Cognitive Status 3/15/2022   Do you have difficulty preparing food and eating? No    Do you have difficulty bathing yourself, getting dressed or grooming yourself? No   Do you have difficulty using the toilet? No   Do you have difficulty moving around from place to place? No   Do you have trouble with steps or getting out of a bed or a chair? No   Current Diet Well Balanced Diet   Dental Exam Up to date   Eye Exam Up to date   Exercise (times per week) 3 times per week   Current Exercises Include Walking   Do you need help using the phone?  No   Are you deaf or do you have serious difficulty hearing?  No   Do you need help with transportation? Yes   Do you need help shopping? No   Do you need help preparing meals?  No   Do you need help with housework?  No   Do you need help with laundry? No   Do you need help taking your medications? No   Do you need help managing money? No   Do you ever drive or ride in a car without wearing a seat belt? No   Have you felt unusual stress, anger or loneliness in the last month? Yes   Who do you live with? Spouse   If you need help, do you have trouble finding someone available to you? No   Have you been bothered in the last four weeks by sexual problems? No   Do you have difficulty concentrating, remembering or making decisions? No       Age-appropriate Screening Schedule:  Refer to the list below for future screening recommendations based on patient's age, sex and/or medical conditions. Orders for these recommended tests are listed in the plan section. The patient has been provided with a written plan.    Health Maintenance   Topic Date Due   • MAMMOGRAM  05/28/2021   • DXA SCAN  06/05/2021   • DIABETIC FOOT EXAM  07/10/2021   • URINE MICROALBUMIN  04/27/2022   • PAP SMEAR  06/28/2022   • HEMOGLOBIN A1C  09/09/2022   • DIABETIC EYE EXAM  02/23/2023   • LIPID PANEL  03/09/2023   • TDAP/TD VACCINES (2 - Td or Tdap) 01/01/2025   • INFLUENZA VACCINE  Completed   • ZOSTER VACCINE  Discontinued            Assessment/Plan CMP had a sugar of 152, calcium 10.8 and was  otherwise normal and lipid panel is stable with total cholesterol 142, HDL 46, LDL 68.  Hemoglobin A1c was minimally improved at 7.0.  TSH and free T4 were normal on supplement.  Vitamin D level remains low at 16.1  #1-hypertension controlled on medication  #2-hyperlipidemia controlled on medication  #3-diabetes mellitus type 2 with minimally improved hemoglobin A1c  #4-obesity with slight weight loss  #5-hypothyroidism, euthyroid on replacement  #6-vitamin D deficiency    CMS Preventative Services Quick Reference  Risk Factors Identified During Encounter  Immunizations Discussed/Encouraged (specific Immunizations; Shingrix  Obesity/Overweight   The above risks/problems have been discussed with the patient.  Follow up actions/plans if indicated are seen below in the Assessment/Plan Section.  Pertinent information has been shared with the patient in the After Visit Summary.    There are no diagnoses linked to this encounter.    Follow Up: The patient will increase her vitamin D to 2000 units daily.  She has already scheduled with gynecology in May and will be getting mammograms and bone density then.  She will go ahead and arrange follow-up with Dr. Alcala for colonoscopy.  I encouraged her to continue work on losing weight and watching dietary carbs.  I am adding in Jardiance 10 mg daily for her diabetes.  I would like to recheck her in 4 months with laboratory studies.  The patient has had 1 Shingrix immunization and I recommended she go ahead and get the second dose of that.    No follow-ups on file.     An After Visit Summary and PPPS were made available to the patient.

## 2022-07-01 ENCOUNTER — TELEPHONE (OUTPATIENT)
Dept: FAMILY MEDICINE CLINIC | Facility: CLINIC | Age: 67
End: 2022-07-01

## 2022-07-01 NOTE — TELEPHONE ENCOUNTER
Hub staff attempted to follow warm transfer process and was unsuccessful     Caller: Nehal Diaz    Relationship to patient: Self    Best call back number: [phone]    Patient is needing: PLEASE CALL TO RESCHEDULE LABS FROM July TO SEPTEMBER

## 2022-08-15 ENCOUNTER — APPOINTMENT (OUTPATIENT)
Dept: WOMENS IMAGING | Facility: HOSPITAL | Age: 67
End: 2022-08-15

## 2022-08-15 PROCEDURE — 77063 BREAST TOMOSYNTHESIS BI: CPT | Performed by: RADIOLOGY

## 2022-08-15 PROCEDURE — 77067 SCR MAMMO BI INCL CAD: CPT | Performed by: RADIOLOGY

## 2022-08-22 ENCOUNTER — TELEPHONE (OUTPATIENT)
Dept: GASTROENTEROLOGY | Facility: CLINIC | Age: 67
End: 2022-08-22

## 2022-09-15 DIAGNOSIS — E78.5 HYPERLIPIDEMIA, UNSPECIFIED HYPERLIPIDEMIA TYPE: Primary | ICD-10-CM

## 2022-09-15 DIAGNOSIS — E11.9 TYPE 2 DIABETES MELLITUS WITHOUT COMPLICATION, WITHOUT LONG-TERM CURRENT USE OF INSULIN: ICD-10-CM

## 2022-11-02 ENCOUNTER — HOSPITAL ENCOUNTER (OUTPATIENT)
Dept: GENERAL RADIOLOGY | Facility: HOSPITAL | Age: 67
Discharge: HOME | End: 2022-11-02
Admitting: INTERNAL MEDICINE

## 2022-11-02 ENCOUNTER — OFFICE VISIT (OUTPATIENT)
Dept: FAMILY MEDICINE CLINIC | Facility: CLINIC | Age: 67
End: 2022-11-02

## 2022-11-02 VITALS
DIASTOLIC BLOOD PRESSURE: 80 MMHG | OXYGEN SATURATION: 99 % | BODY MASS INDEX: 38.99 KG/M2 | WEIGHT: 228.4 LBS | RESPIRATION RATE: 18 BRPM | HEART RATE: 92 BPM | TEMPERATURE: 97.5 F | HEIGHT: 64 IN | SYSTOLIC BLOOD PRESSURE: 130 MMHG

## 2022-11-02 DIAGNOSIS — J30.1 CHRONIC SEASONAL ALLERGIC RHINITIS DUE TO POLLEN: Primary | ICD-10-CM

## 2022-11-02 DIAGNOSIS — I10 PRIMARY HYPERTENSION: ICD-10-CM

## 2022-11-02 DIAGNOSIS — M19.90 ARTHRITIS: ICD-10-CM

## 2022-11-02 DIAGNOSIS — E78.5 HYPERLIPIDEMIA, UNSPECIFIED HYPERLIPIDEMIA TYPE: ICD-10-CM

## 2022-11-02 DIAGNOSIS — E03.9 ACQUIRED HYPOTHYROIDISM: ICD-10-CM

## 2022-11-02 DIAGNOSIS — M06.9 RHEUMATOID ARTHRITIS INVOLVING MULTIPLE SITES, UNSPECIFIED WHETHER RHEUMATOID FACTOR PRESENT: ICD-10-CM

## 2022-11-02 DIAGNOSIS — E66.09 CLASS 2 OBESITY DUE TO EXCESS CALORIES WITHOUT SERIOUS COMORBIDITY WITH BODY MASS INDEX (BMI) OF 38.0 TO 38.9 IN ADULT: ICD-10-CM

## 2022-11-02 DIAGNOSIS — Z12.11 ENCOUNTER FOR SCREENING FOR MALIGNANT NEOPLASM OF COLON: ICD-10-CM

## 2022-11-02 DIAGNOSIS — E11.9 TYPE 2 DIABETES MELLITUS WITHOUT COMPLICATION, WITHOUT LONG-TERM CURRENT USE OF INSULIN: ICD-10-CM

## 2022-11-02 DIAGNOSIS — E55.9 VITAMIN D DEFICIENCY: ICD-10-CM

## 2022-11-02 PROCEDURE — 99214 OFFICE O/P EST MOD 30 MIN: CPT | Performed by: INTERNAL MEDICINE

## 2022-11-02 PROCEDURE — 73502 X-RAY EXAM HIP UNI 2-3 VIEWS: CPT

## 2022-11-02 NOTE — PROGRESS NOTES
Subjective   Nehal Diaz is a 66 y.o. female. Patient is here today for follow-up on her hypertension, hyperlipidemia, hypothyroidism, obesity, diabetes mellitus type 2 and vitamin D deficiency.  She is generally stable.  She has been having some left buttock and hip pain especially with certain movements such as trying to get out of the car when she has been in for a while.  She has had no falls or trauma.  She is not had any significant weight loss.  She tells me she did see gynecology and is up-to-date with Pap smears mammograms and bone density    Chief Complaint   Patient presents with   • Hypertension     HYPERLIPIDEMIA, DIABETES, HYPOTHYROIDISM- F/U LABS          Vitals:    11/02/22 0911   BP: 130/80   Pulse: 92   Resp: 18   Temp: 97.5 °F (36.4 °C)   SpO2: 99%     Body mass index is 39.19 kg/m².  The following portions of the patient's history were reviewed and updated as appropriate: allergies, current medications, past family history, past medical history, past social history, past surgical history and problem list.    Past Medical History:   Diagnosis Date   • Allergic     seasonal   • Arthritis    • Cancer (HCC)     skin, followed by Dermatology   • Cataract    • Cerumen impaction    • Cholelithiasis    • Depression     followed by Psychiatry Nisha CRAWFORD   • GERD (gastroesophageal reflux disease)    • Glaucoma 2000   • Hyperlipidemia    • Hypertension    • Hypothyroidism    • Kidney stone    • Obesity    • Osteoporosis    • Rheumatoid arthritis (HCC)     followed by Rheumatology, Dr. Watkins   • Seborrheic dermatitis of scalp    • Type 2 diabetes mellitus (HCC)    • Visual impairment    • Vitamin D deficiency       Allergies   Allergen Reactions   • Codeine Nausea And Vomiting      Social History     Socioeconomic History   • Marital status:    Tobacco Use   • Smoking status: Never   • Smokeless tobacco: Never   Vaping Use   • Vaping Use: Never used   Substance and Sexual Activity   •  Alcohol use: Not Currently     Comment: socially, rarely   • Drug use: No   • Sexual activity: Not Currently     Partners: Male        Current Outpatient Medications:   •  abatacept (ORENCIA) 250 MG injection, Infuse  into a venous catheter., Disp: , Rfl:   •  ACCU-CHEK SOFTCLIX LANCETS lancets, TEST SUGAR DAILY, Disp: 100 each, Rfl: 1  •  atorvastatin (LIPITOR) 20 MG tablet, Take 1 tablet by mouth Daily., Disp: 90 tablet, Rfl: 3  •  Blood Glucose Monitoring Suppl (ACCU-CHEK CHARISSA PLUS) w/Device kit, TEST DAILY, Disp: 1 kit, Rfl: 0  •  cholecalciferol (Vitamin D) 25 MCG (1000 UT) tablet, Take 2 tablets by mouth Daily., Disp: 30 tablet, Rfl: 5  •  diclofenac (VOLTAREN) 1 % gel gel, VICKEY 4 GRAMS TO AFFECTED JOINTS QID PRN, Disp: , Rfl: 3  •  glucose blood (ACCU-CHEK CHARISSA PLUS) test strip, TEST SUGAR DAILY, Disp: 100 each, Rfl: 3  •  glucose monitor monitoring kit, 1 each Daily., Disp: 1 each, Rfl: 0  •  leflunomide (ARAVA) 20 MG tablet, Take 20 mg by mouth Daily., Disp: , Rfl:   •  levothyroxine (SYNTHROID, LEVOTHROID) 150 MCG tablet, Take 1 tablet by mouth Daily., Disp: 90 tablet, Rfl: 3  •  metFORMIN (GLUCOPHAGE) 500 MG tablet, Take 2 tablets by mouth 2 (Two) Times a Day With Meals., Disp: 360 tablet, Rfl: 3  •  ramipril (ALTACE) 10 MG capsule, Take 1 capsule by mouth Daily., Disp: 90 capsule, Rfl: 3  •  timolol (TIMOPTIC) 0.5 % ophthalmic solution, INSTILL 1 DROP IN BOTH EYES QD., Disp: , Rfl: 11     Objective     History of Present Illness     Review of Systems    Physical Exam  Vitals and nursing note reviewed.   Constitutional:       General: She is not in acute distress.     Appearance: Normal appearance. She is obese. She is not ill-appearing.   HENT:      Head: Normocephalic and atraumatic.   Cardiovascular:      Rate and Rhythm: Normal rate and regular rhythm.      Heart sounds: Normal heart sounds.   Pulmonary:      Effort: Pulmonary effort is normal. No respiratory distress.      Breath sounds: Normal  breath sounds. No wheezing or rales.   Musculoskeletal:      Comments: Straight leg raising was negative on the left but there was some hip pain with movement   Skin:     General: Skin is warm and dry.   Neurological:      General: No focal deficit present.      Mental Status: She is alert and oriented to person, place, and time.   Psychiatric:         Mood and Affect: Mood normal.         Behavior: Behavior normal.         ASSESSMENT CBC was normal.  CMP has a sugar of 152 and was otherwise normal and hemoglobin A1c increased to 7.3.  Urine microalbumin was stable at 11.8.  Lipid panel is total cholesterol 150, HDL 45 and LDL 79  #1-hypertension controlled on medication  #2-hyperlipidemia controlled on medication   #3-hypothyroidism, Asymptomatic and stable  #4-obesity with no significant weight loss  #5-diabetes mellitus type 2, not optimally controlled  #6-left buttock and hip pain       Problems Addressed this Visit        Allergies and Adverse Reactions    Chronic seasonal allergic rhinitis due to pollen - Primary       Cardiac and Vasculature    Hypertension    Hyperlipidemia       Endocrine and Metabolic    Hypothyroidism    Obesity    Vitamin D deficiency    Type 2 diabetes mellitus (HCC)    Relevant Medications    metFORMIN (GLUCOPHAGE) 500 MG tablet       Musculoskeletal and Injuries    Rheumatoid arthritis (HCC)    Arthritis    Relevant Orders    XR Hip With or Without Pelvis 2 - 3 View Left   Other Visit Diagnoses     Encounter for screening for malignant neoplasm of colon        Relevant Orders    Cologuard - Stool, Per Rectum      Diagnoses       Codes Comments    Chronic seasonal allergic rhinitis due to pollen    -  Primary ICD-10-CM: J30.1  ICD-9-CM: 477.0     Hyperlipidemia, unspecified hyperlipidemia type     ICD-10-CM: E78.5  ICD-9-CM: 272.4     Primary hypertension     ICD-10-CM: I10  ICD-9-CM: 401.9     Acquired hypothyroidism     ICD-10-CM: E03.9  ICD-9-CM: 244.9     Class 2 obesity due to  excess calories without serious comorbidity with body mass index (BMI) of 38.0 to 38.9 in adult     ICD-10-CM: E66.09, Z68.38  ICD-9-CM: 278.00, V85.38     Type 2 diabetes mellitus without complication, without long-term current use of insulin (HCC)     ICD-10-CM: E11.9  ICD-9-CM: 250.00     Vitamin D deficiency     ICD-10-CM: E55.9  ICD-9-CM: 268.9     Encounter for screening for malignant neoplasm of colon     ICD-10-CM: Z12.11  ICD-9-CM: V76.51     Arthritis     ICD-10-CM: M19.90  ICD-9-CM: 716.90     Rheumatoid arthritis involving multiple sites, unspecified whether rheumatoid factor present (HCC)     ICD-10-CM: M06.9  ICD-9-CM: 714.0           PLAN I encouraged the patient to try and increase her activity and see if she can gradually lose some weight.  I am increasing her metformin to 500 mg, 2 tablets with breakfast and 2 tablets with dinner.  She will continue other medications as now.  I have ordered a left hip x-ray and we will review the results on it.  I also ordered a Cologuard test for the patient as she is due for screening.  I would like to recheck her in 3 months with laboratory studies    There are no Patient Instructions on file for this visit.  Return in about 3 months (around 2/2/2023) for with labs.

## 2023-01-30 ENCOUNTER — EXTERNAL PBMM DATA (OUTPATIENT)
Dept: PHARMACY | Facility: OTHER | Age: 68
End: 2023-01-30
Payer: MEDICARE

## 2023-02-21 NOTE — TELEPHONE ENCOUNTER
----- Message from Elena Bragg sent at 10/30/2017  5:11 PM EDT -----  Patient notified via phone. States that she saw Rheumatologist Dr. Watkins and all labs were normal. She will call and make a lab appointment in 3 months. Please place the order for the TSH.   Thanks        Order placed for follow up TSH to be drawn in January 2018  
no dizziness/no fever/no nausea

## 2023-02-27 ENCOUNTER — EXTERNAL PBMM DATA (OUTPATIENT)
Dept: PHARMACY | Facility: OTHER | Age: 68
End: 2023-02-27
Payer: MEDICARE

## 2023-03-03 ENCOUNTER — POP HEALTH PHARMACY (OUTPATIENT)
Dept: PHARMACY | Facility: OTHER | Age: 68
End: 2023-03-03
Payer: MEDICARE

## 2023-03-03 NOTE — PROGRESS NOTES
[facility] [place] [facility]      Nehal Diaz was called today to discuss medication adherence with ATORVASTATIN CALCIUM ([facility] COA REDUCTASE INHIBITORS) , as she was identified as having care opportunities.    Program Details    [facility]  Status: Enrolled  Effective Dates: 3/3/2023 - present  Responsible Staff: Rafael [facility]          Opportunities Identified    Adherence- Cholesterol       Adherence and Medication Management    Medication Adherence    What concerns does the patient have in regards to their medications: Atorvastatin flagged as non-adherent by insurance provider  Patient reported X missed doses in the last 7 days: 0  Any gaps in refill history greater than 2 weeks in the last 3 months: no  Demonstrates understanding of importance of adherence: yes  Informant: patient  Reliability of informant: reliable  Reasons for non-adherence: no problems identified         General Medication Management    Type of medication management: targeted medication review  Referred by: insurance group  Recipient: beneficiary  Provider: pharmacist - other  Visit type: initial  Method of contact: by telephone           Medication Therapy Problems     Medication Therapy Recommendations  No medication therapy recommendations to display      Summary    Medication Management Summary    Topics discussed: possible adverse effects and management discussed, adherence and missed doses discussed, monitoring medication discussed  Time spent: 46 - 60 min       I spoke with Nehal about her atorvastatin, since there had been previous gaps in her fills. She somehow ended up with an extra bottle of it at home. She does not believe she is experiencing any side effects and is taking it daily as directed.    Kelly Harris, PharmD 03/03/23, 10:41 AM EST.

## 2023-03-17 ENCOUNTER — POP HEALTH PHARMACY (OUTPATIENT)
Dept: PHARMACY | Facility: OTHER | Age: 68
End: 2023-03-17
Payer: MEDICARE

## 2023-05-01 ENCOUNTER — TELEPHONE (OUTPATIENT)
Dept: GASTROENTEROLOGY | Facility: CLINIC | Age: 68
End: 2023-05-01
Payer: MEDICARE

## 2023-05-01 NOTE — TELEPHONE ENCOUNTER
MALCOLM PT AND INFORMED THAT A REFERRAL IS NEEDED FROM HER PCP DR SHAIKH FOR SCHEDULING SCOPE PROCEDURE.  PT STATES SHE WILL SEE PCP ON 6/6/2023 AND WILL ASK FOR THE REFERRAL.

## 2023-05-11 DIAGNOSIS — E78.5 HYPERLIPIDEMIA, UNSPECIFIED HYPERLIPIDEMIA TYPE: ICD-10-CM

## 2023-05-11 DIAGNOSIS — E03.9 HYPOTHYROIDISM, UNSPECIFIED TYPE: ICD-10-CM

## 2023-05-11 RX ORDER — LEVOTHYROXINE SODIUM 0.15 MG/1
TABLET ORAL
Qty: 90 TABLET | Refills: 3 | Status: SHIPPED | OUTPATIENT
Start: 2023-05-11

## 2023-05-11 RX ORDER — ATORVASTATIN CALCIUM 20 MG/1
TABLET, FILM COATED ORAL
Qty: 90 TABLET | Refills: 3 | Status: SHIPPED | OUTPATIENT
Start: 2023-05-11

## 2023-05-18 ENCOUNTER — POP HEALTH PHARMACY (OUTPATIENT)
Dept: PHARMACY | Facility: OTHER | Age: 68
End: 2023-05-18
Payer: MEDICARE

## 2023-05-22 ENCOUNTER — TELEPHONE (OUTPATIENT)
Dept: FAMILY MEDICINE CLINIC | Facility: CLINIC | Age: 68
End: 2023-05-22
Payer: MEDICARE

## 2023-05-22 NOTE — TELEPHONE ENCOUNTER
Caller: Nehal Diaz    Relationship to patient: Self    Best call back number: [phone]    Patient is needing: PATIENT NEEDS TO RESCHEDULE LABS FROM 6/01/23 TO July.  PLEASE CALL HER BACK TO RESCHEDULE BEFORE THE APPOINTMENT ON 7/19/23.

## 2023-08-02 ENCOUNTER — POP HEALTH PHARMACY (OUTPATIENT)
Dept: PHARMACY | Facility: OTHER | Age: 68
End: 2023-08-02
Payer: MEDICARE

## 2023-09-12 ENCOUNTER — TELEPHONE (OUTPATIENT)
Dept: FAMILY MEDICINE CLINIC | Facility: CLINIC | Age: 68
End: 2023-09-12
Payer: MEDICARE

## 2023-09-12 NOTE — TELEPHONE ENCOUNTER
Caller: Nehal Diaz    Relationship to patient: Self    Best call back number: [phone]    Type of visit: LABS    Requested date: 9/15 10AM      If rescheduling, when is the original appointment: 9/14

## 2023-09-13 NOTE — TELEPHONE ENCOUNTER
Called pt and lmtocb to re-schedule fasting labs per no appts available on 9/15/23 for labs in the am only have 1:00 pm-3:30 pm available on 9/15/23.  Pt can be rescheduled for Monday am labs but nothing available on 9/15/23    Hub to read:  no appts available on 9/15/23 for labs in the am only have 1:00 pm-3:30 pm available on 9/15/23.  Pt can be rescheduled for Monday am labs but nothing available on 9/15/23

## 2023-09-21 ENCOUNTER — TELEPHONE (OUTPATIENT)
Dept: FAMILY MEDICINE CLINIC | Facility: CLINIC | Age: 68
End: 2023-09-21